# Patient Record
Sex: MALE | Race: WHITE | NOT HISPANIC OR LATINO | Employment: STUDENT | URBAN - METROPOLITAN AREA
[De-identification: names, ages, dates, MRNs, and addresses within clinical notes are randomized per-mention and may not be internally consistent; named-entity substitution may affect disease eponyms.]

---

## 2017-10-01 ENCOUNTER — HOSPITAL ENCOUNTER (EMERGENCY)
Facility: HOSPITAL | Age: 27
Discharge: HOME | End: 2017-10-02
Attending: EMERGENCY MEDICINE
Payer: MEDICAID

## 2017-10-01 DIAGNOSIS — F32.A DEPRESSION, UNSPECIFIED DEPRESSION TYPE: Primary | ICD-10-CM

## 2017-10-01 LAB
ALBUMIN SERPL BCP-MCNC: 3.7 G/DL
ALP SERPL-CCNC: 74 U/L
ALT SERPL W/O P-5'-P-CCNC: 52 U/L
AMPHET+METHAMPHET UR QL: NEGATIVE
ANION GAP SERPL CALC-SCNC: 13 MMOL/L
APAP SERPL-MCNC: <3 UG/ML
AST SERPL-CCNC: 53 U/L
BARBITURATES UR QL SCN>200 NG/ML: NEGATIVE
BASOPHILS # BLD AUTO: 0.02 K/UL
BASOPHILS NFR BLD: 0.4 %
BENZODIAZ UR QL SCN>200 NG/ML: NEGATIVE
BILIRUB SERPL-MCNC: 0.3 MG/DL
BILIRUB UR QL STRIP: NEGATIVE
BUN SERPL-MCNC: 10 MG/DL
BZE UR QL SCN: NEGATIVE
CALCIUM SERPL-MCNC: 9.3 MG/DL
CANNABINOIDS UR QL SCN: NEGATIVE
CHLORIDE SERPL-SCNC: 112 MMOL/L
CLARITY UR REFRACT.AUTO: CLEAR
CO2 SERPL-SCNC: 20 MMOL/L
COLOR UR AUTO: NORMAL
CREAT SERPL-MCNC: 0.7 MG/DL
CREAT UR-MCNC: 58 MG/DL
DIFFERENTIAL METHOD: ABNORMAL
EOSINOPHIL # BLD AUTO: 0.1 K/UL
EOSINOPHIL NFR BLD: 1.2 %
ERYTHROCYTE [DISTWIDTH] IN BLOOD BY AUTOMATED COUNT: 13.6 %
EST. GFR  (AFRICAN AMERICAN): >60 ML/MIN/1.73 M^2
EST. GFR  (NON AFRICAN AMERICAN): >60 ML/MIN/1.73 M^2
ETHANOL SERPL-MCNC: 140 MG/DL
ETHANOL SERPL-MCNC: 293 MG/DL
ETHANOL SERPL-MCNC: 68 MG/DL
GLUCOSE SERPL-MCNC: 89 MG/DL
GLUCOSE UR QL STRIP: NEGATIVE
HCT VFR BLD AUTO: 44.9 %
HGB BLD-MCNC: 15.2 G/DL
HGB UR QL STRIP: NEGATIVE
KETONES UR QL STRIP: NEGATIVE
LEUKOCYTE ESTERASE UR QL STRIP: NEGATIVE
LYMPHOCYTES # BLD AUTO: 2 K/UL
LYMPHOCYTES NFR BLD: 38.2 %
MCH RBC QN AUTO: 31.5 PG
MCHC RBC AUTO-ENTMCNC: 33.9 G/DL
MCV RBC AUTO: 93 FL
METHADONE UR QL SCN>300 NG/ML: NEGATIVE
MONOCYTES # BLD AUTO: 0.5 K/UL
MONOCYTES NFR BLD: 9.5 %
NEUTROPHILS # BLD AUTO: 2.6 K/UL
NEUTROPHILS NFR BLD: 50.3 %
NITRITE UR QL STRIP: NEGATIVE
OPIATES UR QL SCN: NEGATIVE
PCP UR QL SCN>25 NG/ML: NEGATIVE
PH UR STRIP: 5 [PH] (ref 5–8)
PLATELET # BLD AUTO: 251 K/UL
PMV BLD AUTO: 9.7 FL
POTASSIUM SERPL-SCNC: 3.9 MMOL/L
PROT SERPL-MCNC: 7.7 G/DL
PROT UR QL STRIP: NEGATIVE
RBC # BLD AUTO: 4.83 M/UL
SODIUM SERPL-SCNC: 145 MMOL/L
SP GR UR STRIP: 1.01 (ref 1–1.03)
TOXICOLOGY INFORMATION: NORMAL
TSH SERPL DL<=0.005 MIU/L-ACNC: 0.67 UIU/ML
URN SPEC COLLECT METH UR: NORMAL
UROBILINOGEN UR STRIP-ACNC: NEGATIVE EU/DL
WBC # BLD AUTO: 5.18 K/UL

## 2017-10-01 PROCEDURE — 80307 DRUG TEST PRSMV CHEM ANLYZR: CPT

## 2017-10-01 PROCEDURE — 99284 EMERGENCY DEPT VISIT MOD MDM: CPT | Mod: ,,,

## 2017-10-01 PROCEDURE — 80053 COMPREHEN METABOLIC PANEL: CPT

## 2017-10-01 PROCEDURE — 80320 DRUG SCREEN QUANTALCOHOLS: CPT | Mod: 91

## 2017-10-01 PROCEDURE — 85025 COMPLETE CBC W/AUTO DIFF WBC: CPT

## 2017-10-01 PROCEDURE — 93005 ELECTROCARDIOGRAM TRACING: CPT

## 2017-10-01 PROCEDURE — 84443 ASSAY THYROID STIM HORMONE: CPT

## 2017-10-01 PROCEDURE — 80329 ANALGESICS NON-OPIOID 1 OR 2: CPT

## 2017-10-01 PROCEDURE — 81003 URINALYSIS AUTO W/O SCOPE: CPT

## 2017-10-01 PROCEDURE — 93010 ELECTROCARDIOGRAM REPORT: CPT | Mod: ,,, | Performed by: INTERNAL MEDICINE

## 2017-10-01 PROCEDURE — 99285 EMERGENCY DEPT VISIT HI MDM: CPT | Mod: 25

## 2017-10-01 PROCEDURE — 80320 DRUG SCREEN QUANTALCOHOLS: CPT

## 2017-10-01 NOTE — ED NOTES
"Patient's mother called and given info per patient's request, and asked if the psychiatrist could speak to the patient about possible options upon D/C; patient;s mother states that he "has had a problem with alcohol for a long time and he doesn't have insurance so maybe if he would want to, he could go to [facility] [[facility] Asks if psychiatry can be made aware of request, and bring it up to the patient.  "

## 2017-10-01 NOTE — ED PROVIDER NOTES
Encounter Date: 10/1/2017       History     Chief Complaint   Patient presents with    Depression     Pt presenred to the ED via [facility]. Pt c/o depression and anxiety. Pt states he wants to blow up a [employer].      Mr. Frias is a 27 y.o. male with no relevant pmh who presents with sxs of severe depression.    He recently moved back to his parents' house from [place] to assist with his father who just passed away from pancreatic cancer.   He reports having a number of life stressors including his father's death, mother suffering from Parkinson's disease, and having to manage the family's finances.    Stressors caused recurrent insomnia that he has coped with drinking 1 bottle of red wine every night to 'fall asleep'.   He went out drinking last night with a friend and came home this morning intoxicated with alcohol at which time his mother called the police.     He denies SI/HI. He reports mentioning that he was going to blow up the [employer] to get attention from his mother about his depressed state but that he would never act on it.     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          The history is provided by the patient.     Review of patient's allergies indicates:  No Known Allergies  No past medical history on file.  Past Surgical History:   Procedure Laterality Date    WISDOM TOOTH EXTRACTION       No family history on file.  Social History   Substance Use Topics    Smoking status: Never Smoker    Smokeless tobacco: Never Used    Alcohol use Yes     Review of Systems   Constitutional: Negative for activity change, appetite change, chills, fatigue and fever.   HENT: Negative for postnasal drip and rhinorrhea.    Eyes: Negative for photophobia and visual disturbance.   Respiratory: Negative for cough, shortness of breath and wheezing.    Cardiovascular: Negative for chest pain, palpitations and leg swelling.    Gastrointestinal: Negative for diarrhea and vomiting.   Musculoskeletal: Negative for arthralgias and myalgias.   Neurological: Negative for weakness and numbness.   Psychiatric/Behavioral: Positive for agitation and behavioral problems. Negative for self-injury. The patient is nervous/anxious.        Physical Exam     Initial Vitals [10/01/17 1128]   BP Pulse Resp Temp SpO2   129/81 98 20 98.1 °F (36.7 °C) 100 %      MAP       97         Physical Exam    Nursing note and vitals reviewed.  Constitutional: He appears well-developed and well-nourished.   HENT:   Head: Normocephalic and atraumatic.   Eyes: EOM are normal. Pupils are equal, round, and reactive to light.   Neck: Normal range of motion. Neck supple.   Cardiovascular: Normal rate, regular rhythm, normal heart sounds and intact distal pulses.   Pulmonary/Chest: Breath sounds normal. No respiratory distress. He has no wheezes.   Abdominal: Soft. Bowel sounds are normal.   Neurological: He is alert and oriented to person, place, and time. He has normal strength.   Psychiatric: He has a normal mood and affect. His behavior is normal. Thought content normal.         ED Course   Procedures  Labs Reviewed   CBC W/ AUTO DIFFERENTIAL - Abnormal; Notable for the following:        Result Value    MCH 31.5 (*)     All other components within normal limits   COMPREHENSIVE METABOLIC PANEL - Abnormal; Notable for the following:     Chloride 112 (*)     CO2 20 (*)     AST 53 (*)     ALT 52 (*)     All other components within normal limits   ALCOHOL,MEDICAL (ETHANOL) - Abnormal; Notable for the following:     Alcohol, Medical, Serum 293 (*)     All other components within normal limits   ACETAMINOPHEN LEVEL - Abnormal; Notable for the following:     Acetaminophen (Tylenol), Serum <3.0 (*)     All other components within normal limits   ALCOHOL,MEDICAL (ETHANOL) - Abnormal; Notable for the following:     Alcohol, Medical, Serum 140 (*)     All other components within  normal limits   ALCOHOL,MEDICAL (ETHANOL) - Abnormal; Notable for the following:     Alcohol, Medical, Serum 68 (*)     All other components within normal limits   TSH   URINALYSIS   DRUG SCREEN PANEL, URINE EMERGENCY                   APC / Resident Notes:   1250: Pt seen and examined. Discussed with staff. PEC put in place.   1517: Pt resting comfortably. We will recheck his MENA at 7PM. When sober psych will need to evaluate him.     12:57 AM   I assumed care of this patient. ETOH drawn at 7p was 140. ETOH at 9PM was 68. Psych evaluated patient and declared patient not harm to self or others. Threats made while intoxicated and patient had no intention of following through.  Patient has no hx of violence, no objective evidence of anders/psychosis, and also denies access to any means of actually blowing a [employer] up. Patient cleared to have PEC lifted and to be discharged home. I re-evaluated patient, denies SI/HI. Discharged to home in stable condition, return to ED warnings given, follow up and patient care instructions given.                ED Course      Clinical Impression:   The encounter diagnosis was Depression, unspecified depression type.    Disposition:   Disposition: Discharged  Condition: Stable                        Lacey iSngh PA-C  10/02/17 [id number]

## 2017-10-01 NOTE — ED NOTES
"Appearance: Patient resting comfortably and in no acute distress. Patient is clean and well groomed, with clothing properly fastened. Patient appears to be intoxicated.  Cardiac: Heart sounds audible, and patient is tachycardic, but asymptomatic. Patient states "my pressure is probably high."  Neuro/LOC: Eyes open spontaneously, behavior appropriate to situation, follows commands, facial expression symmetrical, purposeful motor response noted. AAOx4; The patient is awake, alert and aware of environment. Patient is slurring a few words, and appears to be intoxicated. States that he "went out drinking to have a good time" last night and then got into a fight with his mom and that is why he is here.  Respiratory: Breath sounds audible, equal and clear bilaterally. Airway is open and patent, respirations are spontaneous, patient has a normal effort and rate, no accessory muscle use noted.  Skin: Intact, warm and dry. Color is consistent with ethnicity. Normal skin turgor and moist mucous membranes.  Gastro: Bowel sounds audible and active x4 quadrants. No tenderness and no distention are present.  Musculoskeletal: Patient moving all extremities spontaneously, no obvious swelling or deformities noted.  Peripheral vascular: Pulses +2 x4 upper/lower extremities.     -Patient identifiers verified and correct for this patient.  -Patient resting with bedrails up x2 for safety, bed locked in low position, and call bell within reach.  "

## 2017-10-01 NOTE — MEDICAL/APP STUDENT
History     Chief Complaint   Patient presents with    Depression     Pt presenred to the ED via  EMS. Pt c/o depression and anxiety. Pt states he wants to blow up a [employer].      The history is provided by the patient.       No past medical history on file.    Mr. Frias is a 27 y.o. male with no relevant pmh who presents with sxs of severe depression.    He recently moved back to his parents' house from [place] to assist with his father who just passed away from pancreatic cancer.   He reports having a number of life stressors including his father's death, mother suffering from Parkinson's disease, and having to manage the family's finances.    Stressors caused recurrent insomnia that he has coped with drinking 1 bottle of red wine every night to 'fall asleep'.   He went out drinking last night with a friend and came home this morning intoxicated with alcohol at which time his mother called the police.     He denies SI/HI. He reports mentioning that he was going to blow up the [employer] to get attention from his mother about his depressed state but that he would never act on it.     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      Past Surgical History:   Procedure Laterality Date    WISDOM TOOTH EXTRACTION         No family history on file.    Social History   Substance Use Topics    Smoking status: Never Smoker    Smokeless tobacco: Never Used    Alcohol use Yes       Review of Systems   Constitutional: Negative for activity change, appetite change and diaphoresis.   HENT: Negative for congestion, postnasal drip, rhinorrhea, sneezing and sore throat.    Eyes: Negative for photophobia, discharge and redness.   Respiratory: Negative for cough, chest tightness, shortness of breath, wheezing and stridor.    Cardiovascular: Negative for chest pain, palpitations and leg swelling.   Gastrointestinal: Negative for abdominal pain,  "constipation, diarrhea, nausea and vomiting.   Endocrine: Negative for cold intolerance and heat intolerance.   Musculoskeletal: Negative for arthralgias.   Skin: Negative for color change and rash.   Neurological: Negative for dizziness and weakness.   Hematological: Negative for adenopathy.   Psychiatric/Behavioral: Positive for agitation and dysphoric mood. Negative for behavioral problems.      Review of Systems   Constitutional: Negative for activity change, appetite change and diaphoresis.   HENT: Negative for congestion, postnasal drip, rhinorrhea, sneezing and sore throat.    Eyes: Negative for photophobia, discharge and redness.   Respiratory: Negative for cough, chest tightness, shortness of breath, wheezing and stridor.    Cardiovascular: Negative for chest pain, palpitations and leg swelling.   Gastrointestinal: Negative for abdominal pain, constipation, diarrhea, nausea and vomiting.   Musculoskeletal: Negative for arthralgias.   Skin: Negative for color change and rash.   Neurological: Negative for dizziness and weakness.   Endo/Heme/Allergies: Negative for cold intolerance, heat intolerance and adenopathy.   Psychiatric/Behavioral: Positive for agitation and dysphoric mood. Negative for behavioral problems.       Physical Exam   /81   Pulse 98   Temp 98.1 °F (36.7 °C) (Oral)   Resp 20   Ht 6' 4" (1.93 m)   Wt (!) 145.2 kg (320 lb)   SpO2 100%   BMI 38.95 kg/m²     Physical Exam    Constitutional: He appears well-developed.   Cardiovascular: Normal rate, regular rhythm, normal heart sounds and intact distal pulses. Exam reveals no friction rub.    No murmur heard.  Pulmonary/Chest: He has no wheezes. He has no rhonchi. He has no rales.   Abdominal: He exhibits no distension and no mass. There is no tenderness. There is no rebound and no guarding.   Skin: No rash noted.   Psychiatric: He is agitated. He is not aggressive and not hyperactive. Thought content is not paranoid and not " delusional. Cognition and memory are not impaired. He does not express impulsivity. He exhibits a depressed mood. He expresses no homicidal and no suicidal ideation. He expresses no suicidal plans and no homicidal plans. He exhibits normal recent memory and normal remote memory.         ED Course     Assessment:  Patient reports severe depression following father's death.    Plan:  PEC and psychiatry evaluation  Tox screen

## 2017-10-01 NOTE — ED NOTES
Pt remains in paper scrubs, resting on stretcher comfortably. No signs of distress noted. Sitter remains at bedside in direct visual contact, charting per protocol every 15 minutes. No equipment or belongings are in the patients room. Pt aware of plan of care. Will continue to monitor.       Patient tray was delivered, and patient ate 90% of tray. Trash removed from room.

## 2017-10-01 NOTE — ED NOTES
Pt remains in paper scrubs, sleeping on stretcher comfortably, easily aroused. No signs of distress noted. Sitter remains at bedside in direct visual contact, charting per protocol every 15 minutes. No equipment or belongings are in the patients room. Pt aware of plan of care. Will continue to monitor.

## 2017-10-01 NOTE — ED NOTES
Pt remains in paper scrubs, resting on stretcher comfortably. No signs of distress noted. Sitter remains at bedside in direct visual contact, charting per protocol every 15 minutes. No equipment or belongings are in the patients room. Pt aware of plan of care. Will continue to monitor.

## 2017-10-01 NOTE — ED NOTES
Patient moved to room 20. Belongings put into bag, labeled, and locked in storage room for patient belongings. Patient given paper scrubs, and changed into them. All cords, cleaning supplies, patient belongings, and unnecessary equipment removed from patient's room. Sitter is at the bedside, with no personal belongings in or near room, and will perform patient checks q15 minutes (patient mood, etc). Transfer form signed by patient. Vital signs will be completed q4h. Will continue to monitor patient.

## 2017-10-02 VITALS
DIASTOLIC BLOOD PRESSURE: 93 MMHG | OXYGEN SATURATION: 98 % | WEIGHT: 315 LBS | SYSTOLIC BLOOD PRESSURE: 147 MMHG | HEART RATE: 110 BPM | HEIGHT: 76 IN | TEMPERATURE: 99 F | BODY MASS INDEX: 38.36 KG/M2 | RESPIRATION RATE: 18 BRPM

## 2017-10-02 PROBLEM — F10.929 ALCOHOL INTOXICATION: Status: ACTIVE | Noted: 2017-10-02

## 2017-10-02 NOTE — SUBJECTIVE & OBJECTIVE
"  Psychotherapeutics     None           Review of Systems  Objective:     Vital Signs (Most Recent):  Temp: 98.6 °F (37 °C) (10/01/17 1539)  Pulse: 104 (10/01/17 1830)  Resp: 16 (10/01/17 1830)  BP: 130/87 (10/01/17 1830)  SpO2: 97 % (10/01/17 1830) Vital Signs (24h Range):  Temp:  [98.1 °F (36.7 °C)-98.7 °F (37.1 °C)] 98.6 °F (37 °C)  Pulse:  [] 104  Resp:  [16-20] 16  SpO2:  [96 %-100 %] 97 %  BP: (129-130)/(81-88) 130/87     Height: 6' 4" (193 cm)  Weight: (!) 145.2 kg (320 lb)  Body mass index is 38.95 kg/m².    No intake or output data in the 24 hours ending 10/02/17 0005    Physical Exam   Mental Status Exam:  Appearance: fair hygiene/grooming wearing scrubs in NAD  Behavior: calm, cooperative  Speech: normal rate, tone, and volume  Mood: good  Affect: full, reactive  Thought Process: linear, goal directed  Thought Perceptions: denied AVH  Thought Content: denied SI, HI; no delusions apparent  Sensorium: awake, alert  Attention/Concentration: intact to conversation  Orientation: person, place, time, and situation  Memory: intact (recent, remote)  Abstraction: intact   Insight: limited  Judgment: fair     Significant Labs:   Recent Results (from the past 48 hour(s))   Urinalysis    Collection Time: 10/01/17 12:05 PM   Result Value Ref Range    Specimen UA Urine, Clean Catch     Color, UA Straw Yellow, Straw, Barbara    Appearance, UA Clear Clear    pH, UA 5.0 5.0 - 8.0    Specific Gravity, UA 1.010 1.005 - 1.030    Protein, UA Negative Negative    Glucose, UA Negative Negative    Ketones, UA Negative Negative    Bilirubin (UA) Negative Negative    Occult Blood UA Negative Negative    Nitrite, UA Negative Negative    Urobilinogen, UA Negative <2.0 EU/dL    Leukocytes, UA Negative Negative   Drug screen panel, emergency    Collection Time: 10/01/17 12:05 PM   Result Value Ref Range    Benzodiazepines Negative     Methadone metabolites Negative     Cocaine (Metab.) Negative     Opiate Scrn, Ur Negative     " Barbiturate Screen, Ur Negative     Amphetamine Screen, Ur Negative     THC Negative     Phencyclidine Negative     Creatinine, Random Ur 58.0 23.0 - 375.0 mg/dL    Toxicology Information SEE COMMENT    CBC auto differential    Collection Time: 10/01/17 12:29 PM   Result Value Ref Range    WBC 5.18 3.90 - 12.70 K/uL    RBC 4.83 4.60 - 6.20 M/uL    Hemoglobin 15.2 14.0 - 18.0 g/dL    Hematocrit 44.9 40.0 - 54.0 %    MCV 93 82 - 98 fL    MCH 31.5 (H) 27.0 - 31.0 pg    MCHC 33.9 32.0 - 36.0 g/dL    RDW 13.6 11.5 - 14.5 %    Platelets 251 150 - 350 K/uL    MPV 9.7 9.2 - 12.9 fL    Gran # 2.6 1.8 - 7.7 K/uL    Lymph # 2.0 1.0 - 4.8 K/uL    Mono # 0.5 0.3 - 1.0 K/uL    Eos # 0.1 0.0 - 0.5 K/uL    Baso # 0.02 0.00 - 0.20 K/uL    Gran% 50.3 38.0 - 73.0 %    Lymph% 38.2 18.0 - 48.0 %    Mono% 9.5 4.0 - 15.0 %    Eosinophil% 1.2 0.0 - 8.0 %    Basophil% 0.4 0.0 - 1.9 %    Differential Method Automated    Comprehensive metabolic panel    Collection Time: 10/01/17 12:29 PM   Result Value Ref Range    Sodium 145 136 - 145 mmol/L    Potassium 3.9 3.5 - 5.1 mmol/L    Chloride 112 (H) 95 - 110 mmol/L    CO2 20 (L) 23 - 29 mmol/L    Glucose 89 70 - 110 mg/dL    BUN, Bld 10 6 - 20 mg/dL    Creatinine 0.7 0.5 - 1.4 mg/dL    Calcium 9.3 8.7 - 10.5 mg/dL    Total Protein 7.7 6.0 - 8.4 g/dL    Albumin 3.7 3.5 - 5.2 g/dL    Total Bilirubin 0.3 0.1 - 1.0 mg/dL    Alkaline Phosphatase 74 55 - 135 U/L    AST 53 (H) 10 - 40 U/L    ALT 52 (H) 10 - 44 U/L    Anion Gap 13 8 - 16 mmol/L    eGFR if African American >60.0 >60 mL/min/1.73 m^2    eGFR if non African American >60.0 >60 mL/min/1.73 m^2   TSH    Collection Time: 10/01/17 12:29 PM   Result Value Ref Range    TSH 0.670 0.400 - 4.000 uIU/mL   Ethanol    Collection Time: 10/01/17 12:29 PM   Result Value Ref Range    Alcohol, Medical, Serum 293 (H) <10 mg/dL   Acetaminophen level    Collection Time: 10/01/17 12:29 PM   Result Value Ref Range    Acetaminophen (Tylenol), Serum <3.0 (L) 10.0 -  20.0 ug/mL   Ethanol    Collection Time: 10/01/17  6:21 PM   Result Value Ref Range    Alcohol, Medical, Serum 140 (H) <10 mg/dL   Ethanol    Collection Time: 10/01/17  9:16 PM   Result Value Ref Range    Alcohol, Medical, Serum 68 (H) <10 mg/dL      No results found for: PHENYTOIN, PHENOBARB, VALPROATE, CBMZ     Significant Imaging: I have reviewed all pertinent imaging results/findings within the past 24 hours.

## 2017-10-02 NOTE — HPI
Darryn Frias is a 27yoM with no formal past psych hx who presented to the ED intoxicated after an argument with his mother. At some point in the argument, pt threatened to blow up the mother's [employer], at which time the police were called and pt was brought to [facility] ED. Psychiatry was consulted to evaluate for danger to others.    Of note: pt arrived to [facility] ED intoxicated with a BAL of 293. Pt allowed to sober up, and psychiatry interview took place well after pt's BAL <100.     On interview, pt is calm and cooperative with appropriate affect and linear thoughts. Pt states that he recently moved back in with his folks after his dad got diagnosed with pancreatic cancer. Pt moved from [place] to his parents' place so that he could help out around the house. He has a hx of drinking regularly (several drinks 4-5x/week), but states that his drinking has become more heavy since his dad's cancer diagnosis. Pt partied all night last night until 6am with a friend, then came home intoxicated and started drinking more with intent to watch the [employer] game at 8:30am. Mother did not approve of this, and he and his mom got into an argument. Pt states that he mentioned blowing up her [employer] to get a rouse out of her because she loves her [employer]ist. Pt denies ever having any real intent to physically blow up the [employer]. Pt denies any physical exchange between him and his mother. Pt denies hx of violence and/or violent crimes. Pt has no formal psych hx, no hx of SI/HI, no past psych meds or hospitalizations. Pt expresses no desire to harm anyone, made the comments when he was intoxicated. 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 Pt denies bomb-making or intent to make a bomb of any sort. Pt denies illicit drugs and/or tobacco use, alcohol is primary substance of choice. Multiple drinks daily for past 2 weeks. Pt denies that  alcohol is a problem, thinks mother overreacted about his drinking, not interested in rehab.      Collateral: Raine (mother, cell #) [phone] - straight to voicemail, multiple attempts  by at least 30min; home phone [phone] - busy signal, no answer

## 2017-10-02 NOTE — ASSESSMENT & PLAN NOTE
"- Pt made threatening statements about "blowing up [employer]" toward mother while intoxicated during an argument, has denied HI and denied desire/intent to harm anyone since arrival  - Pt has no hx of violence, no objective evidence of anders/psychosis, and also denies access to any means of actually blowing a [employer] up  - Attempted to reach mother at both cell and home phone for more information - would ideally like to get mother's side of the story prior to discharge, but unfortunately do not have any justification for continuing this PEC as there is no objective evidence of pt being imminently danger to others    - Pt in pre-contemplative stage of change regarding his alcohol use, unlikely to attend rehab at this time. Nonetheless, would provide resource sheet for alcohol abuse resources in [facility] for pt to have if he needs help attaining sobriety  - Rescind PEC and discharge to home, pt states that he will be staying with a friend tonight    "

## 2017-10-02 NOTE — CONSULTS
[facility]  Psychiatry Consult Note    Patient Name: Darryn Frias  MRN: [id number]   Code Status: No Order  Admission Date: 10/1/2017  Hospital Length of Stay: 0 days  Expected Discharge Date:   Attending Physician: Washington Hilliard MD  Primary Care Provider: Devante Jon MD    Current Legal Status: [facility]    Patient information was obtained from patient and ER records.     Subjective:     Principal Problem:<principal problem not specified>    HPI: Darryn Frias is a 27yoM with no formal past psych hx who presented to the ED intoxicated after an argument with his mother. At some point in the argument, pt threatened to blow up the mother's Christian, at which time the police were called and pt was brought to [facility]. Psychiatry was consulted to evaluate for danger to others.    Of note: pt arrived to [facility] intoxicated with a BAL of 293. Pt allowed to sober up, and psychiatry interview took place well after pt's BAL <100.     On interview, pt is calm and cooperative with appropriate affect and linear thoughts. Pt states that he recently moved back in with his folks after his dad got diagnosed with pancreatic cancer. Pt moved from [place] to his parents' place so that he could help out around the house. He has a hx of drinking regularly (several drinks 4-5x/week), but states that his drinking has become more heavy since his dad's cancer diagnosis. Pt partied all night last night until 6am with a friend, then came home intoxicated and started drinking more with intent to watch the [employer] game at 8:30am. Mother did not approve of this, and he and his mom got into an argument. Pt states that he mentioned blowing up her Christian to get a rouse out of her because she loves her Christian. Pt denies ever having any real intent to physically blow up the Christian. Pt denies any physical exchange between him and his mother. Pt denies hx of violence and/or violent crimes. Pt has no formal psych hx, no hx of  "SI/HI, no past psych meds or hospitalizations. Pt expresses no desire to harm anyone, made the comments when he was intoxicated. Pt denies having any physical way of blowing up a Jewish, says that he has some storebought fireworks but it is nothing capable of blowing up a Jewish. Pt denies bomb-making or intent to make a bomb of any sort. Pt denies illicit drugs and/or tobacco use, alcohol is primary substance of choice. Multiple drinks daily for past 2 weeks. Pt denies that alcohol is a problem, thinks mother overreacted about his drinking, not interested in rehab.      Collateral: Raine (mother, cell #) [phone] - straight to voicemail, multiple attempts  by at least 30min; home phone [phone] - busy signal, no answer    Hospital Course: No notes on file      Psychotherapeutics     None           Review of Systems  Objective:     Vital Signs (Most Recent):  Temp: 98.6 °F (37 °C) (10/01/17 1539)  Pulse: 104 (10/01/17 1830)  Resp: 16 (10/01/17 1830)  BP: 130/87 (10/01/17 1830)  SpO2: 97 % (10/01/17 1830) Vital Signs (24h Range):  Temp:  [98.1 °F (36.7 °C)-98.7 °F (37.1 °C)] 98.6 °F (37 °C)  Pulse:  [] 104  Resp:  [16-20] 16  SpO2:  [96 %-100 %] 97 %  BP: (129-130)/(81-88) 130/87     Height: 6' 4" (193 cm)  Weight: (!) 145.2 kg (320 lb)  Body mass index is 38.95 kg/m².    No intake or output data in the 24 hours ending 10/02/17 0005    Physical Exam   Mental Status Exam:  Appearance: fair hygiene/grooming wearing scrubs in NAD  Behavior: calm, cooperative  Speech: normal rate, tone, and volume  Mood: good  Affect: full, reactive  Thought Process: linear, goal directed  Thought Perceptions: denied AVH  Thought Content: denied SI, HI; no delusions apparent  Sensorium: awake, alert  Attention/Concentration: intact to conversation  Orientation: person, place, time, and situation  Memory: intact (recent, remote)  Abstraction: intact   Insight: limited  Judgment: fair     Significant Labs:   Recent " Results (from the past 48 hour(s))   Urinalysis    Collection Time: 10/01/17 12:05 PM   Result Value Ref Range    Specimen UA Urine, Clean Catch     Color, UA Straw Yellow, Straw, Barbara    Appearance, UA Clear Clear    pH, UA 5.0 5.0 - 8.0    Specific Gravity, UA 1.010 1.005 - 1.030    Protein, UA Negative Negative    Glucose, UA Negative Negative    Ketones, UA Negative Negative    Bilirubin (UA) Negative Negative    Occult Blood UA Negative Negative    Nitrite, UA Negative Negative    Urobilinogen, UA Negative <2.0 EU/dL    Leukocytes, UA Negative Negative   Drug screen panel, emergency    Collection Time: 10/01/17 12:05 PM   Result Value Ref Range    Benzodiazepines Negative     Methadone metabolites Negative     Cocaine (Metab.) Negative     Opiate Scrn, Ur Negative     Barbiturate Screen, Ur Negative     Amphetamine Screen, Ur Negative     THC Negative     Phencyclidine Negative     Creatinine, Random Ur 58.0 23.0 - 375.0 mg/dL    Toxicology Information SEE COMMENT    CBC auto differential    Collection Time: 10/01/17 12:29 PM   Result Value Ref Range    WBC 5.18 3.90 - 12.70 K/uL    RBC 4.83 4.60 - 6.20 M/uL    Hemoglobin 15.2 14.0 - 18.0 g/dL    Hematocrit 44.9 40.0 - 54.0 %    MCV 93 82 - 98 fL    MCH 31.5 (H) 27.0 - 31.0 pg    MCHC 33.9 32.0 - 36.0 g/dL    RDW 13.6 11.5 - 14.5 %    Platelets 251 150 - 350 K/uL    MPV 9.7 9.2 - 12.9 fL    Gran # 2.6 1.8 - 7.7 K/uL    Lymph # 2.0 1.0 - 4.8 K/uL    Mono # 0.5 0.3 - 1.0 K/uL    Eos # 0.1 0.0 - 0.5 K/uL    Baso # 0.02 0.00 - 0.20 K/uL    Gran% 50.3 38.0 - 73.0 %    Lymph% 38.2 18.0 - 48.0 %    Mono% 9.5 4.0 - 15.0 %    Eosinophil% 1.2 0.0 - 8.0 %    Basophil% 0.4 0.0 - 1.9 %    Differential Method Automated    Comprehensive metabolic panel    Collection Time: 10/01/17 12:29 PM   Result Value Ref Range    Sodium 145 136 - 145 mmol/L    Potassium 3.9 3.5 - 5.1 mmol/L    Chloride 112 (H) 95 - 110 mmol/L    CO2 20 (L) 23 - 29 mmol/L    Glucose 89 70 - 110 mg/dL     "BUN, Bld 10 6 - 20 mg/dL    Creatinine 0.7 0.5 - 1.4 mg/dL    Calcium 9.3 8.7 - 10.5 mg/dL    Total Protein 7.7 6.0 - 8.4 g/dL    Albumin 3.7 3.5 - 5.2 g/dL    Total Bilirubin 0.3 0.1 - 1.0 mg/dL    Alkaline Phosphatase 74 55 - 135 U/L    AST 53 (H) 10 - 40 U/L    ALT 52 (H) 10 - 44 U/L    Anion Gap 13 8 - 16 mmol/L    eGFR if African American >60.0 >60 mL/min/1.73 m^2    eGFR if non African American >60.0 >60 mL/min/1.73 m^2   TSH    Collection Time: 10/01/17 12:29 PM   Result Value Ref Range    TSH 0.670 0.400 - 4.000 uIU/mL   Ethanol    Collection Time: 10/01/17 12:29 PM   Result Value Ref Range    Alcohol, Medical, Serum 293 (H) <10 mg/dL   Acetaminophen level    Collection Time: 10/01/17 12:29 PM   Result Value Ref Range    Acetaminophen (Tylenol), Serum <3.0 (L) 10.0 - 20.0 ug/mL   Ethanol    Collection Time: 10/01/17  6:21 PM   Result Value Ref Range    Alcohol, Medical, Serum 140 (H) <10 mg/dL   Ethanol    Collection Time: 10/01/17  9:16 PM   Result Value Ref Range    Alcohol, Medical, Serum 68 (H) <10 mg/dL      No results found for: PHENYTOIN, PHENOBARB, VALPROATE, CBMZ     Significant Imaging: I have reviewed all pertinent imaging results/findings within the past 24 hours.    Assessment/Plan:     Alcohol intoxication    - Pt made threatening statements about "blowing up [employer]" toward mother while intoxicated during an argument, has denied HI and denied desire/intent to harm anyone since arrival  - Pt has no hx of violence, no objective evidence of anders/psychosis, and also denies access to any means of actually blowing a [employer] up  - Attempted to reach mother at both cell and home phone for more information - would ideally like to get mother's side of the story prior to discharge, but unfortunately do not have any justification for continuing this PEC as there is no objective evidence of pt being imminently danger to others    - Pt in pre-contemplative stage of change regarding his alcohol use, unlikely " to attend rehab at this time. Nonetheless, would provide resource sheet for alcohol abuse resources in [facility] for pt to have if he needs help attaining sobriety  - Rescind PEC and discharge to home, pt states that he will be staying with a friend tonight               Need for Continued Hospitalization:   No need for inpatient psychiatric hospitalization. Continue medical care as per the primary team.    Anticipated Disposition: Home or Self Care    Simone Su MD  [employer] Psychiatry PGY-II  10/02/2017 12:14 AM

## 2018-08-16 ENCOUNTER — HOSPITAL ENCOUNTER (EMERGENCY)
Facility: HOSPITAL | Age: 28
Discharge: HOME | End: 2018-08-17
Payer: MEDICAID

## 2018-08-16 VITALS
BODY MASS INDEX: 47.09 KG/M2 | SYSTOLIC BLOOD PRESSURE: 121 MMHG | HEIGHT: 67 IN | TEMPERATURE: 97 F | RESPIRATION RATE: 19 BRPM | DIASTOLIC BLOOD PRESSURE: 79 MMHG | OXYGEN SATURATION: 95 % | WEIGHT: 300 LBS | HEART RATE: 107 BPM

## 2018-08-16 DIAGNOSIS — F10.120: Primary | ICD-10-CM

## 2018-08-16 LAB
ALBUMIN SERPL BCP-MCNC: 4.2 G/DL
ALP SERPL-CCNC: 57 U/L
ALT SERPL W/O P-5'-P-CCNC: 86 U/L
AMPHET+METHAMPHET UR QL: NEGATIVE
ANION GAP SERPL CALC-SCNC: 20 MMOL/L
APAP SERPL-MCNC: <3 UG/ML
AST SERPL-CCNC: 111 U/L
BACTERIA #/AREA URNS AUTO: ABNORMAL /HPF
BARBITURATES UR QL SCN>200 NG/ML: NEGATIVE
BASOPHILS # BLD AUTO: 0.07 K/UL
BASOPHILS NFR BLD: 1.5 %
BENZODIAZ UR QL SCN>200 NG/ML: NORMAL
BILIRUB SERPL-MCNC: 0.7 MG/DL
BILIRUB UR QL STRIP: NEGATIVE
BUN SERPL-MCNC: 10 MG/DL
BZE UR QL SCN: NEGATIVE
CALCIUM SERPL-MCNC: 9.2 MG/DL
CANNABINOIDS UR QL SCN: NEGATIVE
CHLORIDE SERPL-SCNC: 101 MMOL/L
CLARITY UR REFRACT.AUTO: ABNORMAL
CO2 SERPL-SCNC: 20 MMOL/L
COLOR UR AUTO: YELLOW
CREAT SERPL-MCNC: 0.7 MG/DL
CREAT UR-MCNC: 147 MG/DL
DIFFERENTIAL METHOD: ABNORMAL
EOSINOPHIL # BLD AUTO: 0 K/UL
EOSINOPHIL NFR BLD: 0.2 %
ERYTHROCYTE [DISTWIDTH] IN BLOOD BY AUTOMATED COUNT: 13.8 %
EST. GFR  (AFRICAN AMERICAN): >60 ML/MIN/1.73 M^2
EST. GFR  (NON AFRICAN AMERICAN): >60 ML/MIN/1.73 M^2
ETHANOL SERPL-MCNC: 410 MG/DL
GLUCOSE SERPL-MCNC: 101 MG/DL
GLUCOSE UR QL STRIP: NEGATIVE
HCT VFR BLD AUTO: 47.8 %
HGB BLD-MCNC: 16.1 G/DL
HGB UR QL STRIP: ABNORMAL
HYALINE CASTS UR QL AUTO: 29 /LPF
IMM GRANULOCYTES # BLD AUTO: 0.02 K/UL
IMM GRANULOCYTES NFR BLD AUTO: 0.4 %
KETONES UR QL STRIP: ABNORMAL
LEUKOCYTE ESTERASE UR QL STRIP: NEGATIVE
LYMPHOCYTES # BLD AUTO: 1.9 K/UL
LYMPHOCYTES NFR BLD: 39.4 %
MCH RBC QN AUTO: 31.9 PG
MCHC RBC AUTO-ENTMCNC: 33.7 G/DL
MCV RBC AUTO: 95 FL
METHADONE UR QL SCN>300 NG/ML: NEGATIVE
MICROSCOPIC COMMENT: ABNORMAL
MONOCYTES # BLD AUTO: 0.4 K/UL
MONOCYTES NFR BLD: 9.1 %
NEUTROPHILS # BLD AUTO: 2.4 K/UL
NEUTROPHILS NFR BLD: 49.4 %
NITRITE UR QL STRIP: NEGATIVE
NRBC BLD-RTO: 0 /100 WBC
OPIATES UR QL SCN: NEGATIVE
PCP UR QL SCN>25 NG/ML: NEGATIVE
PH UR STRIP: 5 [PH] (ref 5–8)
PLATELET # BLD AUTO: 240 K/UL
PMV BLD AUTO: 9.3 FL
POTASSIUM SERPL-SCNC: 4.1 MMOL/L
PROT SERPL-MCNC: 8.3 G/DL
PROT UR QL STRIP: ABNORMAL
RBC # BLD AUTO: 5.05 M/UL
RBC #/AREA URNS AUTO: 17 /HPF (ref 0–4)
SODIUM SERPL-SCNC: 141 MMOL/L
SP GR UR STRIP: 1.02 (ref 1–1.03)
SQUAMOUS #/AREA URNS AUTO: 4 /HPF
TOXICOLOGY INFORMATION: NORMAL
TSH SERPL DL<=0.005 MIU/L-ACNC: 2.65 UIU/ML
URN SPEC COLLECT METH UR: ABNORMAL
UROBILINOGEN UR STRIP-ACNC: NEGATIVE EU/DL
WBC # BLD AUTO: 4.82 K/UL
WBC #/AREA URNS AUTO: 5 /HPF (ref 0–5)

## 2018-08-16 PROCEDURE — 85025 COMPLETE CBC W/AUTO DIFF WBC: CPT

## 2018-08-16 PROCEDURE — 80329 ANALGESICS NON-OPIOID 1 OR 2: CPT

## 2018-08-16 PROCEDURE — 84443 ASSAY THYROID STIM HORMONE: CPT

## 2018-08-16 PROCEDURE — 80307 DRUG TEST PRSMV CHEM ANLYZR: CPT

## 2018-08-16 PROCEDURE — 99283 EMERGENCY DEPT VISIT LOW MDM: CPT | Mod: ,,,

## 2018-08-16 PROCEDURE — 80320 DRUG SCREEN QUANTALCOHOLS: CPT

## 2018-08-16 PROCEDURE — 81001 URINALYSIS AUTO W/SCOPE: CPT

## 2018-08-16 PROCEDURE — 99284 EMERGENCY DEPT VISIT MOD MDM: CPT

## 2018-08-16 PROCEDURE — 80053 COMPREHEN METABOLIC PANEL: CPT

## 2018-08-17 NOTE — ED NOTES
Patient identifiers have been checked and are correct for Darryn Frias.     LOC: The patient is awake, alert with slurred speech. Patient appears to be intoxicated.   PSYCHOSOCIAL: Patient is calm and cooperative. Appears clean, well maintained, with clothing appropriate to environment. No evidence of delusions, hallucinations, or psychosis. Patient denies SI/HI.  SKIN: The skin is warm, dry, and intact. No bruising/discolorations noted.  RESPIRATORY: Airway is open and patent. Bilateral chest rise and fall. Respirations are spontaneous, even and unlabored. Normal effort and rate noted. No accessory muscle use noted.   CARDIAC: Patient has a normal HR. No peripheral edema noted. Capillary refill <3 seconds.   ABDOMEN: Soft and non tender to palpation. No distention noted. Bowel sounds present in all 4 quadrants.   URINARY: Patient reports routine urination without pain, frequency, or urgency. Voids independently. Reports urine appears dorys/yellow in color.  EXTREMITIES: No redness, heat, swelling, deformity, or pain.  PULSES: 2+ and equal in all extremities.   NEUROLOGIC: Eyes open spontaneously. Tolerating saliva secretions well. Able to follow commands, demonstrating ability to actively and appropriately communicate within context of current conversation. Symmetrical facial muscles. Moving all extremities well with no noted weakness. Adequate muscle tone present. Movement is purposeful.   MUSCULOSKELETAL: No obvious deformities noted.

## 2018-08-17 NOTE — ED PROVIDER NOTES
Encounter Date: 8/16/2018       History     Chief Complaint   Patient presents with    Alcohol Intoxication     has been drinking all day - pt's  mother hung herself  recently and pt in not coping well - pt is depressed - denies SI/HI -      Mr. Frias is a 28 year old male presenting to the ED with acute alcohol intoxication. He states that he has been depressed due to his mother committing suicide 2 weeks ago. He says he has a 4 year history of heavy drinking, more than 5 drinks a day. But since his mothers death, he says his consumption has increased to 12-15 a day. He reports difficulty sleeping for the past two weeks. He has no SI or HI. He states that he wants help for his depression and alcohol consumption. He denies use of any illicit drugs.           Review of patient's allergies indicates:  No Known Allergies  History reviewed. No pertinent past medical history.  Past Surgical History:   Procedure Laterality Date    WISDOM TOOTH EXTRACTION       History reviewed. No pertinent family history.  Social History     Tobacco Use    Smoking status: Never Smoker    Smokeless tobacco: Never Used   Substance Use Topics    Alcohol use: Yes     Comment: 12-14 vodka drinks per day    Drug use: No     Review of Systems   Constitutional: Negative for activity change and fever.   HENT: Negative for trouble swallowing.    Eyes: Negative for visual disturbance.   Respiratory: Negative for shortness of breath.    Cardiovascular: Negative for chest pain.   Gastrointestinal: Negative for abdominal distention and abdominal pain.   Genitourinary: Negative for difficulty urinating.   Musculoskeletal: Negative for gait problem and myalgias.   Neurological: Negative for light-headedness, numbness and headaches.   Psychiatric/Behavioral: Positive for confusion (Acute intoxication), dysphoric mood and sleep disturbance (difficulty sleeping for 2 weeks). Negative for self-injury and suicidal ideas.       Physical Exam      Initial Vitals   BP Pulse Resp Temp SpO2   08/16/18 1925 08/16/18 1925 08/16/18 1925 08/16/18 1930 08/16/18 1925   126/78 98 18 98.4 °F (36.9 °C) 99 %      MAP       --                Physical Exam    Constitutional: He appears well-developed and well-nourished.   HENT:   Head: Normocephalic and atraumatic.   Eyes: EOM are normal.   Neck: Normal range of motion.   Cardiovascular: Normal rate and normal heart sounds.   Pulmonary/Chest: Breath sounds normal.   Abdominal: Soft. Bowel sounds are normal.   Musculoskeletal: Normal range of motion.   Neurological: He is alert and oriented to person, place, and time.   Skin: Skin is dry.   Psychiatric:   Acute alcohol intoxication         ED Course   Procedures  Labs Reviewed   CBC W/ AUTO DIFFERENTIAL - Abnormal; Notable for the following components:       Result Value    MCH 31.9 (*)     All other components within normal limits   COMPREHENSIVE METABOLIC PANEL - Abnormal; Notable for the following components:    CO2 20 (*)      (*)     ALT 86 (*)     Anion Gap 20 (*)     All other components within normal limits   URINALYSIS, REFLEX TO URINE CULTURE - Abnormal; Notable for the following components:    Appearance, UA Hazy (*)     Protein, UA 2+ (*)     Ketones, UA 1+ (*)     Occult Blood UA 1+ (*)     All other components within normal limits    Narrative:     Preferred Collection Type->Urine, Clean Catch   ALCOHOL,MEDICAL (ETHANOL) - Abnormal; Notable for the following components:    Alcohol, Medical, Serum 410 (*)     All other components within normal limits   ACETAMINOPHEN LEVEL - Abnormal; Notable for the following components:    Acetaminophen (Tylenol), Serum <3.0 (*)     All other components within normal limits   URINALYSIS MICROSCOPIC - Abnormal; Notable for the following components:    RBC, UA 17 (*)     Bacteria, UA Moderate (*)     Hyaline Casts, UA 29 (*)     All other components within normal limits    Narrative:     Preferred Collection  Type->Urine, Clean Catch   TSH   DRUG SCREEN PANEL, URINE EMERGENCY    Narrative:     Preferred Collection Type->Urine, Clean Catch          Imaging Results    None                APC / Resident Notes:   Emergent evaluation of a 27 yo male patient presenting to the ER due to alcohol intoxication  Pt states he has been depressed since his mother  2 weeks ago. He has a history of alcohol misuse which has increased since the death of his mother. He reports no SI or HI. He states that he wants help with his depression and alcohol misuse. The patient reports no use     On exam, pt appears to be acutely intoxicated. He has slurred, but coherent speech and is able to follow commands.     I will get labs, UDS,       I discussed the care of this patient with my Supervising Physician.      Patient is hemodynamically stable, vital signs are normal.   Discharge instructions given.  Return to ED precautions discussed. Follow up as directed. Pt verbalized understanding of this plan.     Pt is stable for discharge.                    Clinical Impression:   The encounter diagnosis was Alcohol use with uncomplicated intoxication with mild use disorder.                             Misael Duff MD  Resident  [phone]

## 2018-08-17 NOTE — ED NOTES
Patient's father is here to pick him up. Patient's father verbalized that he is comfortable taking patient home and that they will follow up with a physician.

## 2018-08-17 NOTE — ED TRIAGE NOTES
"Patient presents to the ED via EMS with complaints of Depression and Alcoholism. Patient reports that he is drinking 12-15 vodka drinks per day. Patient states, "I don't know how else to cope with this." Patient reports that he lost his mother a few months ago and he has struggled with depression since that time. He was at home and his little brother called EMS due to his intoxication. Patient denies SI/HI. Patient reports that he was seen here approx. 3 months ago for same symptoms.   "

## 2019-06-22 ENCOUNTER — HOSPITAL ENCOUNTER (EMERGENCY)
Facility: HOSPITAL | Age: 29
Discharge: HOME | End: 2019-06-22
Attending: EMERGENCY MEDICINE
Payer: MEDICAID

## 2019-06-22 VITALS
OXYGEN SATURATION: 99 % | RESPIRATION RATE: 18 BRPM | HEIGHT: 74 IN | SYSTOLIC BLOOD PRESSURE: 130 MMHG | DIASTOLIC BLOOD PRESSURE: 96 MMHG | WEIGHT: 300 LBS | BODY MASS INDEX: 38.5 KG/M2 | TEMPERATURE: 99 F | HEART RATE: 90 BPM

## 2019-06-22 DIAGNOSIS — Z78.9 ALCOHOL USE: Primary | ICD-10-CM

## 2019-06-22 PROCEDURE — 99281 EMR DPT VST MAYX REQ PHY/QHP: CPT | Mod: ,,, | Performed by: EMERGENCY MEDICINE

## 2019-06-22 PROCEDURE — 99281 EMR DPT VST MAYX REQ PHY/QHP: CPT

## 2019-06-22 PROCEDURE — 99281 PR EMERGENCY DEPT VISIT,LEVEL I: ICD-10-PCS | Mod: ,,, | Performed by: EMERGENCY MEDICINE

## 2019-06-22 NOTE — ED PROVIDER NOTES
"Encounter Date: 6/22/2019    SCRIBE #1 NOTE: I, Jackelyn Diane, am scribing for, and in the presence of, Jalyn Hernandez MD. Other sections scribed: HPI, ROS.       History     Chief Complaint   Patient presents with    Psychiatric Evaluation     pt presents with ems after altercation with father - denies si/hi - reports that dad was drinlking when they got into a fight and he stated " if i killed myself you wouldn't care"  - reports mother committed suicide 1 year ago and thats what they were arguing about     Time patient was seen by the provider: 1:29 AM      The patient is a 28 y.o. male with co-morbidities including: anxiety, depression, and HTN, who presents to the ED for a psychiatric evaluation. Patient was involved in a verbal altercation with his father earlier this evening. Patient stated he would commit suicide "like his mom", and this caused his father to call the police. Patient and his father were arguing about money and the patient's condo. Patient endorses both him and his father were drinking this evening. Patient denies threatening his father or physically assaulting. Patient is prescribed Lorazepam for depression and anxiety.    The history is provided by the patient.     Review of patient's allergies indicates:  No Known Allergies  Past Medical History:   Diagnosis Date    Anxiety     Depression     Hypertension      Past Surgical History:   Procedure Laterality Date    WISDOM TOOTH EXTRACTION       History reviewed. No pertinent family history.  Social History     Tobacco Use    Smoking status: Never Smoker    Smokeless tobacco: Never Used   Substance Use Topics    Alcohol use: Yes     Comment: 2-3 drinks with vodka/ice tea day 12ozes    Drug use: No     Review of Systems   Constitutional: Negative for fever.   HENT: Negative for sore throat.    Respiratory: Negative for shortness of breath.    Cardiovascular: Negative for chest pain.   Gastrointestinal: Negative for nausea. "   Genitourinary: Negative for dysuria.   Musculoskeletal: Negative for back pain.   Skin: Negative for rash.   Neurological: Negative for weakness.   Hematological: Does not bruise/bleed easily.   Psychiatric/Behavioral:        Positive for psych evaluation. Negative for threatening.        Physical Exam     Initial Vitals [06/22/19 0101]   BP Pulse Resp Temp SpO2   (!) 130/96 90 18 98.8 °F (37.1 °C) 99 %      MAP       --         Physical Exam    Nursing note and vitals reviewed.  Constitutional: He appears well-developed and well-nourished. No distress.   HENT:   Head: Normocephalic and atraumatic.   Mouth/Throat: Oropharynx is clear and moist.   Eyes: Conjunctivae are normal.   Neck: Normal range of motion.   Cardiovascular: Normal rate, regular rhythm and normal heart sounds.   Pulmonary/Chest: Breath sounds normal. No respiratory distress.   Abdominal: Soft. He exhibits no distension. There is no tenderness.   Musculoskeletal: Normal range of motion.   Neurological: He is alert and oriented to person, place, and time.   Skin: Skin is warm and dry.         ED Course   Procedures  Labs Reviewed - No data to display       Imaging Results    None          Medical Decision Making:   History:   Old Medical Records: I decided to obtain old medical records.  Initial Assessment:   Emergent evaluation of psychiatric evaluation after argument. Patient was fighting with his father. I spoke to the patient's father, who is clearly intoxicated and doesn't know where his son is. Patient's father thought the patient was still at home. I reviewed the patient's chart. I do not feel he is a danger to himself and that the argument stemmed from alcohol use. Patient is not clinically intoxicated. Patient has clear speech and normal gait. Patient has a sober friend picking him up. I feel he is safe for discharge.             Scribe Attestation:   Scribe #1: I performed the above scribed service and the documentation accurately  describes the services I performed. I attest to the accuracy of the note.               Clinical Impression:       ICD-10-CM ICD-9-CM   1. Alcohol use Z78.9 V49.89                                Jalyn Hernandez MD  06/22/19 [id number]

## 2019-06-22 NOTE — ED TRIAGE NOTES
"Pt presents to the ED c/o of verbal altercation with his father. Pt states he was drinking with his father at their residence when a conversation presented over money. The father became irate with the pt and discussed a sensitive topic concerning the pt's  mother who committed suicide 1 year ago. The father called 911 and officers gave the pt the choice of either going to residential or going the ED to get checked out since the pt has been heavily drinking. Pt states the father stated "I wish you were out of my life just like sae, because if I killed myself you wouldn't care". Pt denies SI/HI, drug use, physical altercation with father. States he's on ativan for anxiety but does not know the correct dose. Pt calm and cooperative. Pt does states that he needs to leave due to having to be at a meeting at 3am in the morning in [place], [place]. Pt is AAOx4. Name and  checked and verified.  "

## 2019-07-19 ENCOUNTER — HOSPITAL ENCOUNTER (EMERGENCY)
Facility: HOSPITAL | Age: 29
Discharge: HOME | End: 2019-07-19
Attending: EMERGENCY MEDICINE
Payer: MEDICAID

## 2019-07-19 VITALS
WEIGHT: 300 LBS | HEART RATE: 81 BPM | HEIGHT: 74 IN | SYSTOLIC BLOOD PRESSURE: 131 MMHG | TEMPERATURE: 98 F | OXYGEN SATURATION: 98 % | DIASTOLIC BLOOD PRESSURE: 69 MMHG | RESPIRATION RATE: 18 BRPM | BODY MASS INDEX: 38.5 KG/M2

## 2019-07-19 DIAGNOSIS — R11.10 VOMITING, INTRACTABILITY OF VOMITING NOT SPECIFIED, PRESENCE OF NAUSEA NOT SPECIFIED, UNSPECIFIED VOMITING TYPE: Primary | ICD-10-CM

## 2019-07-19 LAB
ALBUMIN SERPL BCP-MCNC: 4.1 G/DL (ref 3.5–5.2)
ALP SERPL-CCNC: 43 U/L (ref 55–135)
ALT SERPL W/O P-5'-P-CCNC: 83 U/L (ref 10–44)
ANION GAP SERPL CALC-SCNC: 18 MMOL/L (ref 8–16)
AST SERPL-CCNC: 78 U/L (ref 10–40)
BACTERIA #/AREA URNS AUTO: ABNORMAL /HPF
BASOPHILS # BLD AUTO: 0.05 K/UL (ref 0–0.2)
BASOPHILS NFR BLD: 0.6 % (ref 0–1.9)
BILIRUB SERPL-MCNC: 2.3 MG/DL (ref 0.1–1)
BILIRUB UR QL STRIP: NEGATIVE
BUN SERPL-MCNC: 22 MG/DL (ref 6–20)
CALCIUM SERPL-MCNC: 10.8 MG/DL (ref 8.7–10.5)
CHLORIDE SERPL-SCNC: 90 MMOL/L (ref 95–110)
CK SERPL-CCNC: 275 U/L (ref 20–200)
CLARITY UR REFRACT.AUTO: ABNORMAL
CO2 SERPL-SCNC: 26 MMOL/L (ref 23–29)
COLOR UR AUTO: YELLOW
CREAT SERPL-MCNC: 0.9 MG/DL (ref 0.5–1.4)
DIFFERENTIAL METHOD: ABNORMAL
EOSINOPHIL # BLD AUTO: 0 K/UL (ref 0–0.5)
EOSINOPHIL NFR BLD: 0.5 % (ref 0–8)
ERYTHROCYTE [DISTWIDTH] IN BLOOD BY AUTOMATED COUNT: 13.4 % (ref 11.5–14.5)
EST. GFR  (AFRICAN AMERICAN): >60 ML/MIN/1.73 M^2
EST. GFR  (NON AFRICAN AMERICAN): >60 ML/MIN/1.73 M^2
GLUCOSE SERPL-MCNC: 118 MG/DL (ref 70–110)
GLUCOSE UR QL STRIP: NEGATIVE
HCT VFR BLD AUTO: 45.8 % (ref 40–54)
HGB BLD-MCNC: 16.2 G/DL (ref 14–18)
HGB UR QL STRIP: NEGATIVE
HYALINE CASTS UR QL AUTO: 4 /LPF
IMM GRANULOCYTES # BLD AUTO: 0.03 K/UL (ref 0–0.04)
IMM GRANULOCYTES NFR BLD AUTO: 0.3 % (ref 0–0.5)
KETONES UR QL STRIP: NEGATIVE
LEUKOCYTE ESTERASE UR QL STRIP: ABNORMAL
LIPASE SERPL-CCNC: 12 U/L (ref 4–60)
LYMPHOCYTES # BLD AUTO: 1.1 K/UL (ref 1–4.8)
LYMPHOCYTES NFR BLD: 12.9 % (ref 18–48)
MCH RBC QN AUTO: 34 PG (ref 27–31)
MCHC RBC AUTO-ENTMCNC: 35.4 G/DL (ref 32–36)
MCV RBC AUTO: 96 FL (ref 82–98)
MICROSCOPIC COMMENT: ABNORMAL
MONOCYTES # BLD AUTO: 0.9 K/UL (ref 0.3–1)
MONOCYTES NFR BLD: 9.9 % (ref 4–15)
NEUTROPHILS # BLD AUTO: 6.5 K/UL (ref 1.8–7.7)
NEUTROPHILS NFR BLD: 75.8 % (ref 38–73)
NITRITE UR QL STRIP: NEGATIVE
NON-SQ EPI CELLS #/AREA URNS AUTO: 1 /HPF
NRBC BLD-RTO: 0 /100 WBC
PH UR STRIP: 5 [PH] (ref 5–8)
PLATELET # BLD AUTO: 259 K/UL (ref 150–350)
PMV BLD AUTO: 10.5 FL (ref 9.2–12.9)
POTASSIUM SERPL-SCNC: 3.5 MMOL/L (ref 3.5–5.1)
PROT SERPL-MCNC: 7.7 G/DL (ref 6–8.4)
PROT UR QL STRIP: NEGATIVE
RBC # BLD AUTO: 4.76 M/UL (ref 4.6–6.2)
RBC #/AREA URNS AUTO: 2 /HPF (ref 0–4)
SODIUM SERPL-SCNC: 134 MMOL/L (ref 136–145)
SP GR UR STRIP: 1.01 (ref 1–1.03)
SQUAMOUS #/AREA URNS AUTO: 3 /HPF
URN SPEC COLLECT METH UR: ABNORMAL
WBC # BLD AUTO: 8.6 K/UL (ref 3.9–12.7)
WBC #/AREA URNS AUTO: 23 /HPF (ref 0–5)
WBC CLUMPS UR QL AUTO: ABNORMAL

## 2019-07-19 PROCEDURE — 87086 URINE CULTURE/COLONY COUNT: CPT

## 2019-07-19 PROCEDURE — 81001 URINALYSIS AUTO W/SCOPE: CPT

## 2019-07-19 PROCEDURE — 25000003 PHARM REV CODE 250: Performed by: STUDENT IN AN ORGANIZED HEALTH CARE EDUCATION/TRAINING PROGRAM

## 2019-07-19 PROCEDURE — 25000003 PHARM REV CODE 250: Performed by: EMERGENCY MEDICINE

## 2019-07-19 PROCEDURE — 99284 EMERGENCY DEPT VISIT MOD MDM: CPT | Mod: 25

## 2019-07-19 PROCEDURE — 85025 COMPLETE CBC W/AUTO DIFF WBC: CPT

## 2019-07-19 PROCEDURE — 99284 PR EMERGENCY DEPT VISIT,LEVEL IV: ICD-10-PCS | Mod: ,,, | Performed by: EMERGENCY MEDICINE

## 2019-07-19 PROCEDURE — 96361 HYDRATE IV INFUSION ADD-ON: CPT

## 2019-07-19 PROCEDURE — 80053 COMPREHEN METABOLIC PANEL: CPT

## 2019-07-19 PROCEDURE — 82550 ASSAY OF CK (CPK): CPT

## 2019-07-19 PROCEDURE — 63600175 PHARM REV CODE 636 W HCPCS: Performed by: STUDENT IN AN ORGANIZED HEALTH CARE EDUCATION/TRAINING PROGRAM

## 2019-07-19 PROCEDURE — 83690 ASSAY OF LIPASE: CPT

## 2019-07-19 PROCEDURE — 96374 THER/PROPH/DIAG INJ IV PUSH: CPT

## 2019-07-19 PROCEDURE — 99284 EMERGENCY DEPT VISIT MOD MDM: CPT | Mod: ,,, | Performed by: EMERGENCY MEDICINE

## 2019-07-19 RX ORDER — ONDANSETRON 2 MG/ML
4 INJECTION INTRAMUSCULAR; INTRAVENOUS
Status: COMPLETED | OUTPATIENT
Start: 2019-07-19 | End: 2019-07-19

## 2019-07-19 RX ORDER — FAMOTIDINE 10 MG/ML
20 INJECTION INTRAVENOUS
Status: DISCONTINUED | OUTPATIENT
Start: 2019-07-19 | End: 2019-07-19

## 2019-07-19 RX ORDER — SODIUM CHLORIDE 9 MG/ML
1000 INJECTION, SOLUTION INTRAVENOUS
Status: DISCONTINUED | OUTPATIENT
Start: 2019-07-19 | End: 2019-07-19

## 2019-07-19 RX ADMIN — ALUMINUM HYDROXIDE, MAGNESIUM HYDROXIDE, AND SIMETHICONE 50 ML: 200; 200; 20 SUSPENSION ORAL at 04:07

## 2019-07-19 RX ADMIN — SODIUM CHLORIDE 1000 ML: 0.9 INJECTION, SOLUTION INTRAVENOUS at 04:07

## 2019-07-19 RX ADMIN — ONDANSETRON 4 MG: 2 INJECTION INTRAMUSCULAR; INTRAVENOUS at 04:07

## 2019-07-19 RX ADMIN — SODIUM CHLORIDE 1000 ML: 0.9 INJECTION, SOLUTION INTRAVENOUS at 06:07

## 2019-07-19 NOTE — ED PROVIDER NOTES
Encounter Date: 7/19/2019       History     Chief Complaint   Patient presents with    Nausea     Patient reports nausea and vomiting for the past 24 hours.     Emesis     HPI   29 y/o M with a PMHx of anxiety and ETOH use presents to the ED with C.C of vomiting and generalized pain cramping. Pt states that his father had to be hospitalized on Monday and Tuesday night he went out and got intoxicated. 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 Pt states that Thursday he started vomiting and has not been able to tolerate fluids. He is complaining of abd cramping likely 2/2 vomiting. He states that his last drink was 3 days ago and that he could have gone through withdrawals in the past. He dines any diarrhea, CP or SOB.  He denies any SI or HI at this time  Review of patient's allergies indicates:  No Known Allergies  Past Medical History:   Diagnosis Date    Anxiety     Depression     Hypertension      Past Surgical History:   Procedure Laterality Date    WISDOM TOOTH EXTRACTION       History reviewed. No pertinent family history.  Social History     Tobacco Use    Smoking status: Never Smoker    Smokeless tobacco: Never Used   Substance Use Topics    Alcohol use: Yes     Comment: 2-3 drinks with vodka/ice tea day 12ozes    Drug use: No     Review of Systems   Constitutional: Negative for fever.   HENT: Negative for sore throat.    Respiratory: Negative for cough and shortness of breath.    Cardiovascular: Negative for chest pain.   Gastrointestinal: Positive for vomiting. Negative for abdominal distention, abdominal pain and nausea.   Genitourinary: Negative for dysuria, flank pain and urgency.   Musculoskeletal: Negative for back pain and neck pain.   Skin: Negative for rash.   Neurological: Negative for seizures, weakness, numbness and headaches.   Hematological: Does not bruise/bleed easily.       Physical Exam     Initial Vitals [07/19/19 0303]   BP Pulse Resp Temp SpO2    128/70 90 18 98.6 °F (37 °C) 97 %      MAP       --         Physical Exam    Nursing note and vitals reviewed.  Constitutional: He appears well-developed and well-nourished.   HENT:   Head: Normocephalic and atraumatic.   Mouth/Throat: Oropharynx is clear and moist. No oropharyngeal exudate.   Eyes: EOM are normal. Pupils are equal, round, and reactive to light. Right eye exhibits no discharge. Left eye exhibits no discharge.   Neck: Normal range of motion. No thyromegaly present. No tracheal deviation present. No JVD present.   Cardiovascular: Normal rate, regular rhythm, normal heart sounds and intact distal pulses.   No murmur heard.  Pulmonary/Chest: Breath sounds normal. No respiratory distress. He has no wheezes. He has no rhonchi. He has no rales.   Abdominal: Soft. Bowel sounds are normal. He exhibits no distension. There is no tenderness. There is no rebound.   Musculoskeletal: Normal range of motion. He exhibits tenderness (epigastric ). He exhibits no edema.   Neurological: He is alert and oriented to person, place, and time. He has normal strength. No sensory deficit. GCS score is 15. GCS eye subscore is 4. GCS verbal subscore is 5. GCS motor subscore is 6.   Skin: Skin is warm and dry.         ED Course   Procedures  Labs Reviewed   CBC W/ AUTO DIFFERENTIAL - Abnormal; Notable for the following components:       Result Value    Mean Corpuscular Hemoglobin 34.0 (*)     Gran% 75.8 (*)     Lymph% 12.9 (*)     All other components within normal limits   COMPREHENSIVE METABOLIC PANEL - Abnormal; Notable for the following components:    Sodium 134 (*)     Chloride 90 (*)     Glucose 118 (*)     BUN, Bld 22 (*)     Calcium 10.8 (*)     Total Bilirubin 2.3 (*)     Alkaline Phosphatase 43 (*)     AST 78 (*)     ALT 83 (*)     Anion Gap 18 (*)     All other components within normal limits   URINALYSIS, REFLEX TO URINE CULTURE - Abnormal; Notable for the following components:    Appearance, UA Hazy (*)      "Leukocytes, UA 1+ (*)     All other components within normal limits    Narrative:     Preferred Collection Type->Urine, Clean Catch   CK - Abnormal; Notable for the following components:     (*)     All other components within normal limits   URINALYSIS MICROSCOPIC - Abnormal; Notable for the following components:    WBC, UA 23 (*)     Non-Squam Epith 1 (*)     Hyaline Casts, UA 4 (*)     All other components within normal limits    Narrative:     Preferred Collection Type->Urine, Clean Catch   CULTURE, URINE   LIPASE          Imaging Results    None          Medical Decision Making:   History:   I obtained history from: someone other than patient.  Triage vitals: Temp 98.6, P 90, RR 18, /70, SpO2 97%.   Physical exam findings: Abd is soft with mild epigastric tenderness. CV and pulmonary exam is normal.    DDx: dehydration, rhabdo, electrolyte abnormalities, gastritis. Alcohol withdrawals was also considered however Pt is not tachycardic, HTN or tremulous.     Workup: CBC, CMP, UA, CPK, Lipase Tx: Gi cocktail, IVFs and zofran.     Tj Sharma MD PGY2  "7/19/2019" 3:58AM     Update:   CBC shows no acute abnormalities. CMP shows and decreased Na, Cl, and an anion gap of 18. This is likely 2/2 alcoholic ketoacidosis as the Pt has a normal bicarb and a history of ETOH use with decreased PO intake. The Pt is tolerating PO now and I orders a 2nd liter of IVF so I anticipate this to down trend.   AST and ALT are mildly elevated so I instructed the Pt to follow up with PCP for monitoring. Pt UA has 23 WBCs however is he is nitrite negative and he is denying nay urinary complaints at this time so I do not believe treating his asymptomatic pyuria is indicated at this time. Discharge and return precautions were discussed with Pt and he verbalized his understanding. Pt is stable for discharge at this time.    Tj Sharma MD PGY2  "7/19/2019" 6:55AM                    Attending Attestation:   Physician " Attestation Statement for Resident:  As the supervising MD   Physician Attestation Statement: I have personally seen and examined this patient.   I agree with the above history. -: 29 yo m, h/o intermittent heavy etoh use, here with n/v and feeling dehydrated to x 1 day.  Labs show small AG, mild elevation in LFTs.  Suspect mild AKA and dehydration.  No abd pain on my assessment and pt reported that n/v had resolved.  Recommend close f/u PCP for LFTs abnormalities - discussed this with pt   As the supervising MD I agree with the above PE.    As the supervising MD I agree with the above treatment, course, plan, and disposition.  I have reviewed and agree with the residents interpretation of the following: lab data.                       Clinical Impression:       ICD-10-CM ICD-9-CM   1. Vomiting, intractability of vomiting not specified, presence of nausea not specified, unspecified vomiting type R11.10 787.03                                Tj Sharma MD  Resident  07/19/19 0655

## 2019-07-19 NOTE — ED TRIAGE NOTES
Darryn Frias, a 28 y.o. male presents to the ED w/ complaint of N/V for the past 24 H. Pt states he is unable to hold anything down. Pt received 4mg of zofran via EMS. Pt states he worked outside in the 90 degree heat for 30+ hours. Pt states this happened previously and it was heat exhaustion.     Triage note:  Chief Complaint   Patient presents with    Nausea     Patient reports nausea and vomiting for the past 24 hours.     Emesis     Review of patient's allergies indicates:  No Known Allergies  Past Medical History:   Diagnosis Date    Anxiety     Depression     Hypertension          Adult Physical Assessment  LOC: Darryn Frias, 28 y.o. male verified via two identifiers.  The patient is awake, alert, oriented and speaking appropriately at this time.  APPEARANCE: Patient resting comfortably and appears to be in no acute distress at this time. Patient is clean and well groomed, patient's clothing is properly fastened.  SKIN:The skin is warm and dry, color consistent with ethnicity, patient has normal skin turgor and moist mucus membranes, skin intact, no breakdown or brusing noted.  MUSCULOSKELETAL: Patient moving all extremities well, no obvious swelling or deformities noted.  RESPIRATORY: Airway is open and patent, respirations are spontaneous, patient has a normal effort and rate, no accessory muscle use noted.  CARDIAC: No c/o CP  ABDOMEN: Soft and non tender to palpation, no abdominal distention noted. Bowel sounds present in all four quadrants. N/V for the past 24 h. Unable to tolerate solids or liquids. Abdominal discomfort from vomiting and not eating.   NEUROLOGIC: Eyes open spontaneously, behavior appropriate to situation, follows commands.

## 2019-07-19 NOTE — DISCHARGE INSTRUCTIONS
Please return to the ED if you are experience SOB, fever, inability to tolerate PO, abd pain, or anything else that is concerning to you.

## 2019-07-20 ENCOUNTER — TELEPHONE (OUTPATIENT)
Dept: PRIMARY CARE CLINIC | Facility: CLINIC | Age: 29
End: 2019-07-20

## 2019-07-20 LAB — BACTERIA UR CULT: NO GROWTH

## 2019-08-20 ENCOUNTER — HOSPITAL ENCOUNTER (INPATIENT)
Facility: HOSPITAL | Age: 29
LOS: 4 days | Discharge: HOME | DRG: 897 | End: 2019-08-24
Attending: EMERGENCY MEDICINE | Admitting: EMERGENCY MEDICINE
Payer: MEDICAID

## 2019-08-20 DIAGNOSIS — F41.9 ANXIETY AND DEPRESSION: ICD-10-CM

## 2019-08-20 DIAGNOSIS — F32.A ANXIETY AND DEPRESSION: ICD-10-CM

## 2019-08-20 DIAGNOSIS — F10.939 ALCOHOL WITHDRAWAL: ICD-10-CM

## 2019-08-20 DIAGNOSIS — K70.10 ALCOHOLIC HEPATITIS WITHOUT ASCITES: Primary | ICD-10-CM

## 2019-08-20 DIAGNOSIS — R07.9 CHEST PAIN: ICD-10-CM

## 2019-08-20 LAB
ALBUMIN SERPL BCP-MCNC: 4.1 G/DL (ref 3.5–5.2)
ALP SERPL-CCNC: 46 U/L (ref 55–135)
ALT SERPL W/O P-5'-P-CCNC: 75 U/L (ref 10–44)
ANION GAP SERPL CALC-SCNC: 19 MMOL/L (ref 8–16)
AST SERPL-CCNC: 156 U/L (ref 10–40)
BASOPHILS # BLD AUTO: 0.03 K/UL (ref 0–0.2)
BASOPHILS NFR BLD: 0.8 % (ref 0–1.9)
BILIRUB SERPL-MCNC: 1.1 MG/DL (ref 0.1–1)
BUN SERPL-MCNC: 9 MG/DL (ref 6–20)
CALCIUM SERPL-MCNC: 9.4 MG/DL (ref 8.7–10.5)
CHLORIDE SERPL-SCNC: 101 MMOL/L (ref 95–110)
CO2 SERPL-SCNC: 21 MMOL/L (ref 23–29)
CREAT SERPL-MCNC: 0.7 MG/DL (ref 0.5–1.4)
DIFFERENTIAL METHOD: ABNORMAL
EOSINOPHIL # BLD AUTO: 0 K/UL (ref 0–0.5)
EOSINOPHIL NFR BLD: 0 % (ref 0–8)
ERYTHROCYTE [DISTWIDTH] IN BLOOD BY AUTOMATED COUNT: 13.1 % (ref 11.5–14.5)
EST. GFR  (AFRICAN AMERICAN): >60 ML/MIN/1.73 M^2
EST. GFR  (NON AFRICAN AMERICAN): >60 ML/MIN/1.73 M^2
GLUCOSE SERPL-MCNC: 114 MG/DL (ref 70–110)
HCT VFR BLD AUTO: 42.8 % (ref 40–54)
HGB BLD-MCNC: 14.6 G/DL (ref 14–18)
IMM GRANULOCYTES # BLD AUTO: 0.02 K/UL (ref 0–0.04)
IMM GRANULOCYTES NFR BLD AUTO: 0.6 % (ref 0–0.5)
INR PPP: 1 (ref 0.8–1.2)
LIPASE SERPL-CCNC: 30 U/L (ref 4–60)
LYMPHOCYTES # BLD AUTO: 0.8 K/UL (ref 1–4.8)
LYMPHOCYTES NFR BLD: 22.8 % (ref 18–48)
MAGNESIUM SERPL-MCNC: 1.3 MG/DL (ref 1.6–2.6)
MCH RBC QN AUTO: 33 PG (ref 27–31)
MCHC RBC AUTO-ENTMCNC: 34.1 G/DL (ref 32–36)
MCV RBC AUTO: 97 FL (ref 82–98)
MONOCYTES # BLD AUTO: 0.4 K/UL (ref 0.3–1)
MONOCYTES NFR BLD: 12.3 % (ref 4–15)
NEUTROPHILS # BLD AUTO: 2.3 K/UL (ref 1.8–7.7)
NEUTROPHILS NFR BLD: 63.5 % (ref 38–73)
NRBC BLD-RTO: 0 /100 WBC
PHOSPHATE SERPL-MCNC: 3.2 MG/DL (ref 2.7–4.5)
PLATELET # BLD AUTO: 95 K/UL (ref 150–350)
PMV BLD AUTO: 10.6 FL (ref 9.2–12.9)
POCT GLUCOSE: 127 MG/DL (ref 70–110)
POTASSIUM SERPL-SCNC: 3.5 MMOL/L (ref 3.5–5.1)
PROT SERPL-MCNC: 7.5 G/DL (ref 6–8.4)
PROTHROMBIN TIME: 10.4 SEC (ref 9–12.5)
RBC # BLD AUTO: 4.43 M/UL (ref 4.6–6.2)
SODIUM SERPL-SCNC: 141 MMOL/L (ref 136–145)
TROPONIN I SERPL DL<=0.01 NG/ML-MCNC: 0.02 NG/ML (ref 0–0.03)
WBC # BLD AUTO: 3.59 K/UL (ref 3.9–12.7)

## 2019-08-20 PROCEDURE — 36415 COLL VENOUS BLD VENIPUNCTURE: CPT

## 2019-08-20 PROCEDURE — 84100 ASSAY OF PHOSPHORUS: CPT

## 2019-08-20 PROCEDURE — 11000001 HC ACUTE MED/SURG PRIVATE ROOM

## 2019-08-20 PROCEDURE — 85610 PROTHROMBIN TIME: CPT

## 2019-08-20 PROCEDURE — 99291 CRITICAL CARE FIRST HOUR: CPT | Mod: ,,, | Performed by: EMERGENCY MEDICINE

## 2019-08-20 PROCEDURE — 99285 EMERGENCY DEPT VISIT HI MDM: CPT | Mod: 25

## 2019-08-20 PROCEDURE — 99223 PR INITIAL HOSPITAL CARE,LEVL III: ICD-10-PCS | Mod: ,,, | Performed by: HOSPITALIST

## 2019-08-20 PROCEDURE — 84484 ASSAY OF TROPONIN QUANT: CPT

## 2019-08-20 PROCEDURE — 96375 TX/PRO/DX INJ NEW DRUG ADDON: CPT | Mod: 59

## 2019-08-20 PROCEDURE — 85025 COMPLETE CBC W/AUTO DIFF WBC: CPT

## 2019-08-20 PROCEDURE — 63600175 PHARM REV CODE 636 W HCPCS: Performed by: HOSPITALIST

## 2019-08-20 PROCEDURE — 96374 THER/PROPH/DIAG INJ IV PUSH: CPT

## 2019-08-20 PROCEDURE — 80053 COMPREHEN METABOLIC PANEL: CPT

## 2019-08-20 PROCEDURE — 25000003 PHARM REV CODE 250: Performed by: HOSPITALIST

## 2019-08-20 PROCEDURE — 83690 ASSAY OF LIPASE: CPT

## 2019-08-20 PROCEDURE — 63600175 PHARM REV CODE 636 W HCPCS: Performed by: EMERGENCY MEDICINE

## 2019-08-20 PROCEDURE — 83735 ASSAY OF MAGNESIUM: CPT

## 2019-08-20 PROCEDURE — 99291 PR CRITICAL CARE, E/M 30-74 MINUTES: ICD-10-PCS | Mod: ,,, | Performed by: EMERGENCY MEDICINE

## 2019-08-20 PROCEDURE — 96361 HYDRATE IV INFUSION ADD-ON: CPT

## 2019-08-20 PROCEDURE — 99223 1ST HOSP IP/OBS HIGH 75: CPT | Mod: ,,, | Performed by: HOSPITALIST

## 2019-08-20 RX ORDER — DIAZEPAM 5 MG/1
5 TABLET ORAL EVERY 8 HOURS
Status: DISCONTINUED | OUTPATIENT
Start: 2019-08-22 | End: 2019-08-20

## 2019-08-20 RX ORDER — GLUCAGON 1 MG
1 KIT INJECTION
Status: DISCONTINUED | OUTPATIENT
Start: 2019-08-20 | End: 2019-08-24 | Stop reason: HOSPADM

## 2019-08-20 RX ORDER — THIAMINE HCL 100 MG
100 TABLET ORAL DAILY
Status: DISCONTINUED | OUTPATIENT
Start: 2019-08-20 | End: 2019-08-24 | Stop reason: HOSPADM

## 2019-08-20 RX ORDER — LORAZEPAM 0.5 MG/1
2 TABLET ORAL EVERY 4 HOURS PRN
Status: DISCONTINUED | OUTPATIENT
Start: 2019-08-20 | End: 2019-08-24 | Stop reason: HOSPADM

## 2019-08-20 RX ORDER — SERTRALINE HYDROCHLORIDE 25 MG/1
25 TABLET, FILM COATED ORAL DAILY
Status: DISCONTINUED | OUTPATIENT
Start: 2019-08-20 | End: 2019-08-24 | Stop reason: HOSPADM

## 2019-08-20 RX ORDER — MORPHINE SULFATE 4 MG/ML
4 INJECTION, SOLUTION INTRAMUSCULAR; INTRAVENOUS
Status: COMPLETED | OUTPATIENT
Start: 2019-08-20 | End: 2019-08-20

## 2019-08-20 RX ORDER — SODIUM CHLORIDE 0.9 % (FLUSH) 0.9 %
10 SYRINGE (ML) INJECTION
Status: DISCONTINUED | OUTPATIENT
Start: 2019-08-20 | End: 2019-08-24 | Stop reason: HOSPADM

## 2019-08-20 RX ORDER — IPRATROPIUM BROMIDE AND ALBUTEROL SULFATE 2.5; .5 MG/3ML; MG/3ML
3 SOLUTION RESPIRATORY (INHALATION) EVERY 4 HOURS PRN
Status: DISCONTINUED | OUTPATIENT
Start: 2019-08-20 | End: 2019-08-24 | Stop reason: HOSPADM

## 2019-08-20 RX ORDER — ONDANSETRON 2 MG/ML
4 INJECTION INTRAMUSCULAR; INTRAVENOUS EVERY 8 HOURS PRN
Status: DISCONTINUED | OUTPATIENT
Start: 2019-08-20 | End: 2019-08-20

## 2019-08-20 RX ORDER — DIAZEPAM 10 MG/2ML
10 INJECTION INTRAMUSCULAR
Status: COMPLETED | OUTPATIENT
Start: 2019-08-20 | End: 2019-08-20

## 2019-08-20 RX ORDER — DIAZEPAM 5 MG/1
5 TABLET ORAL DAILY
Status: DISCONTINUED | OUTPATIENT
Start: 2019-08-24 | End: 2019-08-20

## 2019-08-20 RX ORDER — DIAZEPAM 5 MG/1
5 TABLET ORAL EVERY 6 HOURS
Status: DISCONTINUED | OUTPATIENT
Start: 2019-08-21 | End: 2019-08-20

## 2019-08-20 RX ORDER — FOLIC ACID 1 MG/1
1 TABLET ORAL DAILY
Status: DISCONTINUED | OUTPATIENT
Start: 2019-08-20 | End: 2019-08-24 | Stop reason: HOSPADM

## 2019-08-20 RX ORDER — DIAZEPAM 5 MG/1
5 TABLET ORAL EVERY 12 HOURS
Status: DISCONTINUED | OUTPATIENT
Start: 2019-08-23 | End: 2019-08-20

## 2019-08-20 RX ORDER — BISACODYL 10 MG
10 SUPPOSITORY, RECTAL RECTAL DAILY PRN
Status: DISCONTINUED | OUTPATIENT
Start: 2019-08-20 | End: 2019-08-24 | Stop reason: HOSPADM

## 2019-08-20 RX ORDER — ONDANSETRON 2 MG/ML
4 INJECTION INTRAMUSCULAR; INTRAVENOUS EVERY 6 HOURS PRN
Status: DISCONTINUED | OUTPATIENT
Start: 2019-08-20 | End: 2019-08-24 | Stop reason: HOSPADM

## 2019-08-20 RX ORDER — IBUPROFEN 200 MG
16 TABLET ORAL
Status: DISCONTINUED | OUTPATIENT
Start: 2019-08-20 | End: 2019-08-24 | Stop reason: HOSPADM

## 2019-08-20 RX ORDER — IBUPROFEN 200 MG
24 TABLET ORAL
Status: DISCONTINUED | OUTPATIENT
Start: 2019-08-20 | End: 2019-08-24 | Stop reason: HOSPADM

## 2019-08-20 RX ORDER — SODIUM CHLORIDE 0.9 % (FLUSH) 0.9 %
10 SYRINGE (ML) INJECTION
Status: DISCONTINUED | OUTPATIENT
Start: 2019-08-20 | End: 2019-08-20

## 2019-08-20 RX ORDER — DIAZEPAM 10 MG/2ML
10 INJECTION INTRAMUSCULAR EVERY 8 HOURS
Status: DISCONTINUED | OUTPATIENT
Start: 2019-08-20 | End: 2019-08-21

## 2019-08-20 RX ORDER — ACETAMINOPHEN 325 MG/1
650 TABLET ORAL EVERY 4 HOURS PRN
Status: DISCONTINUED | OUTPATIENT
Start: 2019-08-20 | End: 2019-08-24 | Stop reason: HOSPADM

## 2019-08-20 RX ORDER — AMOXICILLIN 250 MG
1 CAPSULE ORAL 2 TIMES DAILY
Status: DISCONTINUED | OUTPATIENT
Start: 2019-08-20 | End: 2019-08-24 | Stop reason: HOSPADM

## 2019-08-20 RX ORDER — DIAZEPAM 5 MG/ML
10 INJECTION, SOLUTION INTRAMUSCULAR; INTRAVENOUS ONCE
Status: COMPLETED | OUTPATIENT
Start: 2019-08-20 | End: 2019-08-20

## 2019-08-20 RX ORDER — SODIUM CHLORIDE 9 MG/ML
INJECTION, SOLUTION INTRAVENOUS CONTINUOUS
Status: DISCONTINUED | OUTPATIENT
Start: 2019-08-20 | End: 2019-08-20

## 2019-08-20 RX ORDER — HYDRALAZINE HYDROCHLORIDE 25 MG/1
25 TABLET, FILM COATED ORAL EVERY 8 HOURS PRN
Status: DISCONTINUED | OUTPATIENT
Start: 2019-08-20 | End: 2019-08-24 | Stop reason: HOSPADM

## 2019-08-20 RX ORDER — POTASSIUM CHLORIDE 20 MEQ/1
40 TABLET, EXTENDED RELEASE ORAL ONCE
Status: COMPLETED | OUTPATIENT
Start: 2019-08-20 | End: 2019-08-20

## 2019-08-20 RX ORDER — MAGNESIUM SULFATE HEPTAHYDRATE 40 MG/ML
2 INJECTION, SOLUTION INTRAVENOUS
Status: COMPLETED | OUTPATIENT
Start: 2019-08-20 | End: 2019-08-20

## 2019-08-20 RX ADMIN — SENNOSIDES,DOCUSATE SODIUM 1 TABLET: 8.6; 5 TABLET, FILM COATED ORAL at 10:08

## 2019-08-20 RX ADMIN — SENNOSIDES,DOCUSATE SODIUM 1 TABLET: 8.6; 5 TABLET, FILM COATED ORAL at 09:08

## 2019-08-20 RX ADMIN — HYDRALAZINE HYDROCHLORIDE 25 MG: 25 TABLET, FILM COATED ORAL at 03:08

## 2019-08-20 RX ADMIN — DIAZEPAM 10 MG: 5 INJECTION, SOLUTION INTRAMUSCULAR; INTRAVENOUS at 09:08

## 2019-08-20 RX ADMIN — MAGNESIUM SULFATE IN WATER 2 G: 40 INJECTION, SOLUTION INTRAVENOUS at 05:08

## 2019-08-20 RX ADMIN — SERTRALINE HYDROCHLORIDE 25 MG: 25 TABLET ORAL at 09:08

## 2019-08-20 RX ADMIN — ONDANSETRON 4 MG: 2 INJECTION INTRAMUSCULAR; INTRAVENOUS at 09:08

## 2019-08-20 RX ADMIN — POTASSIUM CHLORIDE 40 MEQ: 20 TABLET, EXTENDED RELEASE ORAL at 02:08

## 2019-08-20 RX ADMIN — SODIUM CHLORIDE 1000 ML: 0.9 INJECTION, SOLUTION INTRAVENOUS at 04:08

## 2019-08-20 RX ADMIN — ACETAMINOPHEN 650 MG: 325 TABLET ORAL at 05:08

## 2019-08-20 RX ADMIN — LORAZEPAM 2 MG: 0.5 TABLET ORAL at 01:08

## 2019-08-20 RX ADMIN — FOLIC ACID 1 MG: 1 TABLET ORAL at 02:08

## 2019-08-20 RX ADMIN — DIAZEPAM 10 MG: 5 INJECTION, SOLUTION INTRAMUSCULAR; INTRAVENOUS at 10:08

## 2019-08-20 RX ADMIN — LORAZEPAM 2 MG: 0.5 TABLET ORAL at 08:08

## 2019-08-20 RX ADMIN — DIAZEPAM 10 MG: 5 INJECTION, SOLUTION INTRAMUSCULAR; INTRAVENOUS at 03:08

## 2019-08-20 RX ADMIN — DIAZEPAM 10 MG: 5 INJECTION, SOLUTION INTRAMUSCULAR; INTRAVENOUS at 04:08

## 2019-08-20 RX ADMIN — Medication 100 MG: at 02:08

## 2019-08-20 RX ADMIN — MORPHINE SULFATE 4 MG: 4 INJECTION INTRAVENOUS at 05:08

## 2019-08-20 RX ADMIN — Medication 10 ML: at 03:08

## 2019-08-20 RX ADMIN — ONDANSETRON 4 MG: 2 INJECTION INTRAMUSCULAR; INTRAVENOUS at 05:08

## 2019-08-20 RX ADMIN — SODIUM CHLORIDE: 0.9 INJECTION, SOLUTION INTRAVENOUS at 09:08

## 2019-08-20 RX ADMIN — LORAZEPAM 2 MG: 0.5 TABLET ORAL at 06:08

## 2019-08-20 RX ADMIN — ACETAMINOPHEN 650 MG: 325 TABLET ORAL at 12:08

## 2019-08-20 NOTE — HPI
History of Present Illness:  Patient is a 29 y.o. male with PMH of alcohol abuse over the past year who presents with nausea, vomiting, SOB, and shaking. He stated that he would like to stop drinking alcohol and that's why he came in. The patient and his father both started drinking heavily after his mother's death about a year ago. He drinks about 750 mL of vodka per day. His last drink was about 20 hrs ago. He is very worried that he is in liver failure as well and worried about his abdomen being very distended. He denies any fevers, chills, LOC, CP, diarrhea, or other symptoms. He was prescribed zoloft but has been off of it for about 3 weeks now.

## 2019-08-20 NOTE — ASSESSMENT & PLAN NOTE
Lab Results   Component Value Date    ALT 75 (H) 08/20/2019     (H) 08/20/2019    ALKPHOS 46 (L) 08/20/2019    BILITOT 1.1 (H) 08/20/2019     Encouraged to stop alcohol  Cont to monitor   Abd US: Hepatomegaly and hepatic steatosis.

## 2019-08-20 NOTE — ED NOTES
"Reports new onset of 5/10 "achy" right-sided cp without radiation. Pain is worse with deep breathing. Repeat EKG done. MD aware. Patient in NAD.  "

## 2019-08-20 NOTE — ASSESSMENT & PLAN NOTE
ALCOHOL/SEDATIVE WITHDRAWAL PRECAUTIONS   Cont multivitamin, thiamine 100 mg, and folate 1 mg daily   Monitor vital signs and CIWA q4 hours   Ativan 2 mg q4 hours prn SBP> 160 and HR> 110. Both parameters must be met  Valium 5 mg IV q8h for now   Electrolyte repletion PRN  Patient motivated to quit; will provide with resources

## 2019-08-20 NOTE — ED PROVIDER NOTES
Source of History:  patient    Chief complaint:  Alcohol Problem (c/o n/v and abd pain x 12 hours. last ETOH beverage was 24 hours ago. Normally drinks 1/5 of vodka a day )      HPI:  Darryn Frias is a 29 y.o. male presenting with shakes, nausea and vomiting associated with abdominal pain for the last 12 hr.  Patient has not drank in the last 24 hr.  He states the last time he had nothing to drink for 24 hr was 2 years ago, at which time he has periods to tremors and nausea/vomiting similar to today's.  He denies any other drug use.  Denies a history of seizures or hallucinations from alcohol withdrawal.  States he also has right upper quadrant pain which is mild, intermittent, nonradiating.  Not associated with any activity.  He is also complaining of 5/10 right-sided burning chest pain that is worse with a deep breath that began after arrival in the ED.    ROS: As per HPI and below:    General: No fever.  No chills.  Eyes: No visual changes.  Head: No headache.    Integument: No rashes or lesions.  Chest: No shortness of breath.  Cardiovascular: No chest pain.  Abdomen abdominal pain and nausea associated with vomiting   Urinary: No abnormal urination.  Neurologic: No focal weakness.  No numbness.  Hematologic: No easy bruising.  Endocrine: No excessive thirst or urination.      Review of patient's allergies indicates:  No Known Allergies    No current facility-administered medications on file prior to encounter.      Current Outpatient Medications on File Prior to Encounter   Medication Sig Dispense Refill    LORazepam (ATIVAN) 1 MG tablet Take 1 mg by mouth every 6 (six) hours as needed for Anxiety.      sertraline (ZOLOFT) 25 MG tablet Take 25 mg by mouth once daily.      ondansetron (ZOFRAN-ODT) 4 MG TbDL Take 1 tablet (4 mg total) by mouth every 8 (eight) hours as needed. 12 tablet 0       PMH:  As per HPI and below:  Past Medical History:   Diagnosis Date    Anxiety     Depression      Hypertension      Past Surgical History:   Procedure Laterality Date    WISDOM TOOTH EXTRACTION         Social History     Socioeconomic History    Marital status: Single     Spouse name: Not on file    Number of children: Not on file    Years of education: Not on file    Highest education level: Not on file   Occupational History    Not on file   Social Needs    Financial resource strain: Not on file    Food insecurity:     Worry: Not on file     Inability: Not on file    Transportation needs:     Medical: Not on file     Non-medical: Not on file   Tobacco Use    Smoking status: Never Smoker    Smokeless tobacco: Never Used   Substance and Sexual Activity    Alcohol use: Yes     Comment: 2-3 drinks with vodka/ice tea day 12ozes    Drug use: No    Sexual activity: Not on file   Lifestyle    Physical activity:     Days per week: Not on file     Minutes per session: Not on file    Stress: Not on file   Relationships    Social connections:     Talks on phone: Not on file     Gets together: Not on file     Attends Christianity service: Not on file     Active member of club or organization: Not on file     Attends meetings of clubs or organizations: Not on file     Relationship status: Not on file   Other Topics Concern    Not on file   Social History Narrative    Not on file       No family history on file.    Physical Exam:    Vitals:    08/20/19 0345   BP: (!) 141/85   Pulse: (!) 114   Resp: (!) 22   Temp:      Appearance: No acute distress, mildly diaphoretic, tremulous Skin: No rashes seen.  Good turgor.  No abrasions.  No ecchymoses.  Eyes: No conjunctival injection.  ENT: Oropharynx clear.    Chest: Clear to auscultation bilaterally.  Good air movement.  No wheezes.  No rhonchi.  Cardiovascular: Regular rate and rhythm.  No murmurs. No gallops. No rubs.  Abdomen: Soft.  Not distended.  Nontender.  No guarding.  No rebound. No Masses  Musculoskeletal: Good range of motion all joints.  No  deformities.  Neck supple.  No meningismus.  Neurologic:  Tremulous movements, Moves all extremities.  Normal sensation.  No facial droop.  Normal speech.    Mental Status:  Alert and oriented x 3.  Appropriate, conversant.          Laboratory Studies:  Labs Reviewed   CBC W/ AUTO DIFFERENTIAL   COMPREHENSIVE METABOLIC PANEL   MAGNESIUM   LIPASE   PHOSPHORUS       EKG:  sinus tachycardia rate 108, no ST changes, normal intervals, no T-wave inversions, compared to previous from 2017 no significant change.  X-rays:  On my interpretation no pneumonia or effusion  Chart reviewed:  History of admissions for alcoholism and psychiatric disorders    Imaging Results    None         Medications Given:  Medications   diazePAM injection 10 mg (10 mg Intravenous Given 8/20/19 0356)       Discussed with:  [facility] medicine    MDM:    29 y.o. male with abdominal pain, nausea, vomiting, tachycardia concerning for acute withdrawal.  Patient given 10 mg IV diazepam with improvement in his diaphoresis, however continues to be tachycardic and tremulous.  Given fluids and an additional 10 mg of diazepam, his tremors have improved, however he remains tachycardic.  Labs show an anion gap, elevated liver enzymes, there is concern for alcoholic ketoacidosis.  Patient will be admitted for further treatment.  Patient initially complaining of abdominal pain, now complaining of chest pain, do not suspect acute coronary syndrome, troponin pending.  EKG reassuring.  Do not suspect cholecystitis, as patient is afebrile and this more likely represents pain from gastritis due to excessive alcohol abuse in this clinical setting.    Critical Care Time    Critical care time was provided for 35 minutes exclusive of other billable procedures and teaching time for the support of metabolic organ system where the potential for death, shock, or further decline was possible. Critical care time can include documentation, discussion with consultants, developing  a care plan, as well as direct patient care.    Diagnostic Impression:    1. Chest pain    2. Alcohol withdrawal               Scar Hernandez MD  08/20/19 0535       Scar Hernandez MD  08/20/19 0535

## 2019-08-20 NOTE — ED NOTES
LOC: The patient is awake, alert, and oriented to place, time, situation. Affect is appropriate.  Speech is appropriate and clear.     APPEARANCE: Patient resting comfortably in no acute distress.  Patient is disheveled, un bathed, and soiled with emesis.     SKIN: The skin is warm and dry; face is flushed.  Patient has normal skin turgor and moist mucus membranes.  Skin intact; no breakdown, rash or bruising noted.     MUSCULOSKELETAL: Patient moving upper and lower extremities without difficulty.  Denies weakness and fatigue. Denies pain.    RESPIRATORY: Airway is open and patent. Respirations spontaneous, even, easy, and non-labored.  Patient has a normal effort and rate.  No accessory muscle use noted. Denies cough. Patient denies sob.    CARDIAC: Patient sinus tach 112 bpm.  No peripheral edema noted. No complaints of chest pain.     ABDOMEN: Soft and non tender to palpation. Patient reports recent bloating. Patient reports [id number] n/v without blood. Reports RUQ pain. Reports x1 episode of diarrhea this morning. Last PO intake 15 hours ago.     NEUROLOGIC: Eyes open spontaneously.  Behavior appropriate to situation.  Follows commands; facial expression symmetrical.  Purposeful motor response noted; normal sensation in all extremities. Patient denies headache. Reports dizziness. Patient tremulous. Last alcoholic beverage >24 hours ago. Denies hx withdrawal seizures.

## 2019-08-20 NOTE — PLAN OF CARE
08/20/19 1510   Post-Acute Status   Post-Acute Authorization Other   Other Status No Post-Acute Service Needs   Discharge Delays None known at this time

## 2019-08-20 NOTE — H&P
[facility] Hospital Medicine  History & Physical    Patient Name: Darryn Frias  MRN: [id number]  Admission Date: 8/20/2019  Attending Physician: Carmen Corea MD  Primary Care Provider: Devante Jon MD    [facility] Medicine Team: [facility] MED  Carmen Corea MD     Patient information was obtained from patient and ER records.     Subjective:     Principal Problem:Alcohol withdrawal    Chief Complaint:   Chief Complaint   Patient presents with    Alcohol Problem     c/o n/v and abd pain x 12 hours. last ETOH beverage was 24 hours ago. Normally drinks 1/5 of vodka a day         HPI: History of Present Illness:  Patient is a 29 y.o. male with PMH of alcohol abuse over the past year who presents with nausea, vomiting, SOB, and shaking. He stated that he would like to stop drinking alcohol and that's why he came in. The patient and his father both started drinking heavily after his mother's death about a year ago. He drinks about 750 mL of vodka per day. His last drink was about 20 hrs ago. He is very worried that he is in liver failure as well and worried about his abdomen being very distended. He denies any fevers, chills, LOC, CP, diarrhea, or other symptoms. He was prescribed zoloft but has been off of it for about 3 weeks now.             Past Medical History:   Diagnosis Date    Anxiety     Depression     Hypertension        Past Surgical History:   Procedure Laterality Date    WISDOM TOOTH EXTRACTION         Review of patient's allergies indicates:  No Known Allergies    No current facility-administered medications on file prior to encounter.      Current Outpatient Medications on File Prior to Encounter   Medication Sig    LORazepam (ATIVAN) 1 MG tablet Take 1 mg by mouth every 6 (six) hours as needed for Anxiety.    sertraline (ZOLOFT) 25 MG tablet Take 25 mg by mouth once daily.    ondansetron (ZOFRAN-ODT) 4 MG TbDL Take 1 tablet (4 mg total) by mouth every 8 (eight)  hours as needed.     Family History     None        Tobacco Use    Smoking status: Never Smoker    Smokeless tobacco: Never Used   Substance and Sexual Activity    Alcohol use: Yes     Comment: 2-3 drinks with vodka/ice tea day 12ozes    Drug use: No    Sexual activity: Not on file     Review of Systems   Constitutional: Positive for fatigue. Negative for chills and fever.   HENT: Negative for congestion, sinus pressure, sneezing, sore throat and trouble swallowing.    Eyes: Negative for visual disturbance.   Respiratory: Positive for shortness of breath. Negative for cough, chest tightness and wheezing.    Gastrointestinal: Positive for abdominal distention, nausea and vomiting. Negative for abdominal pain and constipation.   Endocrine: Negative for polyphagia and polyuria.   Genitourinary: Negative for difficulty urinating, flank pain, frequency and hematuria.   Musculoskeletal: Negative for arthralgias.   Skin: Negative for rash.   Neurological: Positive for tremors. Negative for seizures, syncope, light-headedness, numbness and headaches.   Hematological: Does not bruise/bleed easily.   Psychiatric/Behavioral: Negative for hallucinations and self-injury. The patient is nervous/anxious.      Objective:     Vital Signs (Most Recent):  Temp: 97.4 °F (36.3 °C) (08/20/19 1138)  Pulse: (!) 113 (08/20/19 1147)  Resp: 18 (08/20/19 1138)  BP: (!) 182/118 (08/20/19 1138)  SpO2: 96 % (08/20/19 1147) Vital Signs (24h Range):  Temp:  [97.4 °F (36.3 °C)-99.1 °F (37.3 °C)] 97.4 °F (36.3 °C)  Pulse:  [105-128] 113  Resp:  [13-22] 18  SpO2:  [93 %-99 %] 96 %  BP: (130-182)/() 182/118        There is no height or weight on file to calculate BMI.    Physical Exam   Constitutional: He is oriented to person, place, and time.   Obese young male. Appears anxious. Coherent but very tremulous.   HENT:   Head: Normocephalic and atraumatic.   Eyes: No scleral icterus.   Neck: Neck supple.   Cardiovascular: Regular rhythm and  normal heart sounds.   tachycardic   Pulmonary/Chest: Effort normal and breath sounds normal. No respiratory distress.   Abdominal: Soft. Bowel sounds are normal. He exhibits distension (but not tense ). He exhibits no mass. There is no tenderness. There is no rebound and no guarding.   Musculoskeletal: He exhibits no edema.   Neurological: He is alert and oriented to person, place, and time.   Skin: No erythema.   Psychiatric: He has a normal mood and affect. His behavior is normal. Thought content normal.   Vitals reviewed.          Significant Labs: All pertinent labs within the past 24 hours have been reviewed.    Significant Imaging: I have reviewed all pertinent imaging results/findings within the past 24 hours.    Assessment/Plan:     * Alcohol withdrawal  ALCOHOL/SEDATIVE WITHDRAWAL PRECAUTIONS   Cont multivitamin, thiamine 100 mg, and folate 1 mg daily   Monitor vital signs and CIWA q4 hours   Ativan 2 mg q4 hours prn SBP> 160 and HR> 110. Both parameters must be met  Valium 5 mg IV q8h for now   Electrolyte repletion PRN  Patient motivated to quit; will provide with resources     Anxiety and depression  Patient stopped taking zoloft about 2-3 weeks ago  Will resume and monitor closely       Alcoholic hepatitis without ascites  Lab Results   Component Value Date    ALT 75 (H) 08/20/2019     (H) 08/20/2019    ALKPHOS 46 (L) 08/20/2019    BILITOT 1.1 (H) 08/20/2019     Encouraged to stop alcohol  Cont to monitor   Abd US: Hepatomegaly and hepatic steatosis.      VTE Risk Mitigation (From admission, onward)        Ordered     IP VTE HIGH RISK PATIENT  Once      08/20/19 0742     Place OMAR hose  Until discontinued      08/20/19 0632             Carmen Corea MD  Department of Hospital Medicine   [facility]

## 2019-08-20 NOTE — PLAN OF CARE
[facility] ([place]) - [place] - [address] [place] [address]  [address] [place] [address]  [place] [address]  Phone: [phone] Fax: [phone]    Payor: MEDICAID / Plan: [place] / Product Type: Managed Medicaid /         08/20/19 1503   Discharge Assessment   Assessment Type Discharge Planning Assessment   Confirmed/corrected address and phone number on facesheet? Yes   Assessment information obtained from? Patient   Expected Length of Stay (days) 3   Communicated expected length of stay with patient/caregiver yes   Prior to hospitilization cognitive status: Alert/Oriented   Prior to hospitalization functional status: Independent   Current cognitive status: Alert/Oriented   Current Functional Status: Independent   Lives With parent(s)  (Patient lives with his father.)   Able to Return to Prior Arrangements yes   Is patient able to care for self after discharge? Yes   Who are your caregiver(s) and their phone number(s)? Jah Cardoso (father) [phone]   Patient's perception of discharge disposition home or selfcare   Readmission Within the Last 30 Days no previous admission in last 30 days   Patient currently being followed by outpatient case management? No   Patient currently receives any other outside agency services? No   Equipment Currently Used at Home none   Do you have any problems affording any of your prescribed medications? No   Is the patient taking medications as prescribed? yes   Does the patient have transportation home? Yes   Transportation Anticipated other (see comments)  ([employer])   Does the patient receive services at the [facility]? No   Discharge Plan A Home with family   DME Needed Upon Discharge  none   Patient/Family in Agreement with Plan yes

## 2019-08-20 NOTE — ASSESSMENT & PLAN NOTE
ALCOHOL/SEDATIVE WITHDRAWAL PRECAUTIONS   Cont multivitamin, thiamine 100 mg, and folate 1 mg daily   Monitor vital signs q4 hours   Ativan 2 mg q4 hours prn SBP> 160 and HR> 110. Both parameters must be met.   Valium taper: 5 mg q 6 hours x 4 doses, then q 8 hours x 3 doses, then q 12 hours x 3 doses

## 2019-08-20 NOTE — ED TRIAGE NOTES
Pt reports x15 hr n/v. Reports drinking 2 pints alcohol per day. Last drink 24 hours ago. Received 8mg zofran by ems. Pt reports 5/10 RUQ pain and bloating. Pt is tremulous. Denies hx seizures from alcohol withdrawal.

## 2019-08-20 NOTE — SUBJECTIVE & OBJECTIVE
Past Medical History:   Diagnosis Date    Anxiety     Depression     Hypertension        Past Surgical History:   Procedure Laterality Date    WISDOM TOOTH EXTRACTION         Review of patient's allergies indicates:  No Known Allergies    No current facility-administered medications on file prior to encounter.      Current Outpatient Medications on File Prior to Encounter   Medication Sig    LORazepam (ATIVAN) 1 MG tablet Take 1 mg by mouth every 6 (six) hours as needed for Anxiety.    sertraline (ZOLOFT) 25 MG tablet Take 25 mg by mouth once daily.    ondansetron (ZOFRAN-ODT) 4 MG TbDL Take 1 tablet (4 mg total) by mouth every 8 (eight) hours as needed.     Family History     None        Tobacco Use    Smoking status: Never Smoker    Smokeless tobacco: Never Used   Substance and Sexual Activity    Alcohol use: Yes     Comment: 2-3 drinks with vodka/ice tea day 12ozes    Drug use: No    Sexual activity: Not on file     Review of Systems   Constitutional: Positive for fatigue. Negative for chills and fever.   HENT: Negative for congestion, sinus pressure, sneezing, sore throat and trouble swallowing.    Eyes: Negative for visual disturbance.   Respiratory: Positive for shortness of breath. Negative for cough, chest tightness and wheezing.    Gastrointestinal: Positive for abdominal distention, nausea and vomiting. Negative for abdominal pain and constipation.   Endocrine: Negative for polyphagia and polyuria.   Genitourinary: Negative for difficulty urinating, flank pain, frequency and hematuria.   Musculoskeletal: Negative for arthralgias.   Skin: Negative for rash.   Neurological: Positive for tremors. Negative for seizures, syncope, light-headedness, numbness and headaches.   Hematological: Does not bruise/bleed easily.   Psychiatric/Behavioral: Negative for hallucinations and self-injury. The patient is nervous/anxious.      Objective:     Vital Signs (Most Recent):  Temp: 97.4 °F (36.3 °C) (08/20/19  1138)  Pulse: (!) 113 (08/20/19 1147)  Resp: 18 (08/20/19 1138)  BP: (!) 182/118 (08/20/19 1138)  SpO2: 96 % (08/20/19 1147) Vital Signs (24h Range):  Temp:  [97.4 °F (36.3 °C)-99.1 °F (37.3 °C)] 97.4 °F (36.3 °C)  Pulse:  [105-128] 113  Resp:  [13-22] 18  SpO2:  [93 %-99 %] 96 %  BP: (130-182)/() 182/118        There is no height or weight on file to calculate BMI.    Physical Exam   Constitutional: He is oriented to person, place, and time.   Obese young male. Appears anxious. Coherent but very tremulous.   HENT:   Head: Normocephalic and atraumatic.   Eyes: No scleral icterus.   Neck: Neck supple.   Cardiovascular: Regular rhythm and normal heart sounds.   tachycardic   Pulmonary/Chest: Effort normal and breath sounds normal. No respiratory distress.   Abdominal: Soft. Bowel sounds are normal. He exhibits distension (but not tense ). He exhibits no mass. There is no tenderness. There is no rebound and no guarding.   Musculoskeletal: He exhibits no edema.   Neurological: He is alert and oriented to person, place, and time.   Skin: No erythema.   Psychiatric: He has a normal mood and affect. His behavior is normal. Thought content normal.   Vitals reviewed.          Significant Labs: All pertinent labs within the past 24 hours have been reviewed.    Significant Imaging: I have reviewed all pertinent imaging results/findings within the past 24 hours.

## 2019-08-21 LAB
ALBUMIN SERPL BCP-MCNC: 3.7 G/DL (ref 3.5–5.2)
ALP SERPL-CCNC: 47 U/L (ref 55–135)
ALT SERPL W/O P-5'-P-CCNC: 56 U/L (ref 10–44)
ANION GAP SERPL CALC-SCNC: 11 MMOL/L (ref 8–16)
AST SERPL-CCNC: 83 U/L (ref 10–40)
BASOPHILS # BLD AUTO: 0.02 K/UL (ref 0–0.2)
BASOPHILS NFR BLD: 0.4 % (ref 0–1.9)
BILIRUB SERPL-MCNC: 1.6 MG/DL (ref 0.1–1)
BUN SERPL-MCNC: 9 MG/DL (ref 6–20)
CALCIUM SERPL-MCNC: 9.9 MG/DL (ref 8.7–10.5)
CHLORIDE SERPL-SCNC: 101 MMOL/L (ref 95–110)
CK SERPL-CCNC: 207 U/L (ref 20–200)
CO2 SERPL-SCNC: 26 MMOL/L (ref 23–29)
CREAT SERPL-MCNC: 0.7 MG/DL (ref 0.5–1.4)
DIFFERENTIAL METHOD: ABNORMAL
EOSINOPHIL # BLD AUTO: 0 K/UL (ref 0–0.5)
EOSINOPHIL NFR BLD: 0.4 % (ref 0–8)
ERYTHROCYTE [DISTWIDTH] IN BLOOD BY AUTOMATED COUNT: 12.9 % (ref 11.5–14.5)
EST. GFR  (AFRICAN AMERICAN): >60 ML/MIN/1.73 M^2
EST. GFR  (NON AFRICAN AMERICAN): >60 ML/MIN/1.73 M^2
GLUCOSE SERPL-MCNC: 108 MG/DL (ref 70–110)
HCT VFR BLD AUTO: 44.1 % (ref 40–54)
HGB BLD-MCNC: 14.7 G/DL (ref 14–18)
IMM GRANULOCYTES # BLD AUTO: 0.03 K/UL (ref 0–0.04)
IMM GRANULOCYTES NFR BLD AUTO: 0.7 % (ref 0–0.5)
INR PPP: 1 (ref 0.8–1.2)
LYMPHOCYTES # BLD AUTO: 0.7 K/UL (ref 1–4.8)
LYMPHOCYTES NFR BLD: 14.6 % (ref 18–48)
MAGNESIUM SERPL-MCNC: 1.8 MG/DL (ref 1.6–2.6)
MCH RBC QN AUTO: 33.1 PG (ref 27–31)
MCHC RBC AUTO-ENTMCNC: 33.3 G/DL (ref 32–36)
MCV RBC AUTO: 99 FL (ref 82–98)
MONOCYTES # BLD AUTO: 0.6 K/UL (ref 0.3–1)
MONOCYTES NFR BLD: 13.7 % (ref 4–15)
NEUTROPHILS # BLD AUTO: 3.2 K/UL (ref 1.8–7.7)
NEUTROPHILS NFR BLD: 70.2 % (ref 38–73)
NRBC BLD-RTO: 0 /100 WBC
PHOSPHATE SERPL-MCNC: 3.3 MG/DL (ref 2.7–4.5)
PLATELET # BLD AUTO: 86 K/UL (ref 150–350)
PMV BLD AUTO: 10.5 FL (ref 9.2–12.9)
POTASSIUM SERPL-SCNC: 4 MMOL/L (ref 3.5–5.1)
PROT SERPL-MCNC: 6.9 G/DL (ref 6–8.4)
PROTHROMBIN TIME: 10.2 SEC (ref 9–12.5)
RBC # BLD AUTO: 4.44 M/UL (ref 4.6–6.2)
SODIUM SERPL-SCNC: 138 MMOL/L (ref 136–145)
WBC # BLD AUTO: 4.51 K/UL (ref 3.9–12.7)

## 2019-08-21 PROCEDURE — 84100 ASSAY OF PHOSPHORUS: CPT

## 2019-08-21 PROCEDURE — 11000001 HC ACUTE MED/SURG PRIVATE ROOM

## 2019-08-21 PROCEDURE — 83735 ASSAY OF MAGNESIUM: CPT

## 2019-08-21 PROCEDURE — 25000003 PHARM REV CODE 250: Performed by: HOSPITALIST

## 2019-08-21 PROCEDURE — 85610 PROTHROMBIN TIME: CPT

## 2019-08-21 PROCEDURE — 82550 ASSAY OF CK (CPK): CPT

## 2019-08-21 PROCEDURE — 63600175 PHARM REV CODE 636 W HCPCS: Performed by: HOSPITALIST

## 2019-08-21 PROCEDURE — 85025 COMPLETE CBC W/AUTO DIFF WBC: CPT

## 2019-08-21 PROCEDURE — 36415 COLL VENOUS BLD VENIPUNCTURE: CPT

## 2019-08-21 PROCEDURE — 80053 COMPREHEN METABOLIC PANEL: CPT

## 2019-08-21 PROCEDURE — 99232 SBSQ HOSP IP/OBS MODERATE 35: CPT | Mod: ,,, | Performed by: HOSPITALIST

## 2019-08-21 PROCEDURE — 99232 PR SUBSEQUENT HOSPITAL CARE,LEVL II: ICD-10-PCS | Mod: ,,, | Performed by: HOSPITALIST

## 2019-08-21 RX ORDER — DIAZEPAM 10 MG/2ML
5 INJECTION INTRAMUSCULAR EVERY 8 HOURS
Status: DISCONTINUED | OUTPATIENT
Start: 2019-08-21 | End: 2019-08-22

## 2019-08-21 RX ADMIN — DIAZEPAM 10 MG: 5 INJECTION, SOLUTION INTRAMUSCULAR; INTRAVENOUS at 06:08

## 2019-08-21 RX ADMIN — SERTRALINE HYDROCHLORIDE 25 MG: 25 TABLET ORAL at 08:08

## 2019-08-21 RX ADMIN — Medication 100 MG: at 08:08

## 2019-08-21 RX ADMIN — DIAZEPAM 5 MG: 5 INJECTION, SOLUTION INTRAMUSCULAR; INTRAVENOUS at 01:08

## 2019-08-21 RX ADMIN — DIAZEPAM 5 MG: 5 INJECTION, SOLUTION INTRAMUSCULAR; INTRAVENOUS at 09:08

## 2019-08-21 RX ADMIN — ONDANSETRON 4 MG: 2 INJECTION INTRAMUSCULAR; INTRAVENOUS at 08:08

## 2019-08-21 RX ADMIN — ONDANSETRON 4 MG: 2 INJECTION INTRAMUSCULAR; INTRAVENOUS at 04:08

## 2019-08-21 RX ADMIN — SENNOSIDES,DOCUSATE SODIUM 1 TABLET: 8.6; 5 TABLET, FILM COATED ORAL at 08:08

## 2019-08-21 RX ADMIN — LORAZEPAM 2 MG: 0.5 TABLET ORAL at 01:08

## 2019-08-21 RX ADMIN — Medication 10 ML: at 08:08

## 2019-08-21 RX ADMIN — ACETAMINOPHEN 650 MG: 325 TABLET ORAL at 04:08

## 2019-08-21 RX ADMIN — ACETAMINOPHEN 650 MG: 325 TABLET ORAL at 12:08

## 2019-08-21 RX ADMIN — FOLIC ACID 1 MG: 1 TABLET ORAL at 08:08

## 2019-08-21 RX ADMIN — SENNOSIDES,DOCUSATE SODIUM 1 TABLET: 8.6; 5 TABLET, FILM COATED ORAL at 09:08

## 2019-08-21 NOTE — SUBJECTIVE & OBJECTIVE
Interval History: No acute issues overnight. Awake and alert. Tremors have resolved. Tolerating diet without N/V.     Review of Systems   Constitutional: Negative for chills and fever.   HENT: Negative for congestion.    Respiratory: Negative for cough and shortness of breath.    Cardiovascular: Negative for chest pain and palpitations.   Gastrointestinal: Negative for abdominal distention and abdominal pain.   Genitourinary: Negative.    Musculoskeletal: Negative.    Psychiatric/Behavioral: Negative.      Objective:     Vital Signs (Most Recent):  Temp: 97.8 °F (36.6 °C) (08/21/19 0823)  Pulse: 104 (08/21/19 0823)  Resp: 18 (08/21/19 0823)  BP: (!) 161/109 (08/21/19 0823)  SpO2: 98 % (08/21/19 0823) Vital Signs (24h Range):  Temp:  [97.4 °F (36.3 °C)-98 °F (36.7 °C)] 97.8 °F (36.6 °C)  Pulse:  [] 104  Resp:  [16-18] 18  SpO2:  [94 %-99 %] 98 %  BP: (161-182)/() 161/109        There is no height or weight on file to calculate BMI.    Intake/Output Summary (Last 24 hours) at 8/21/2019 1120  Last data filed at 8/21/2019 0900  Gross per 24 hour   Intake 1320 ml   Output --   Net 1320 ml      Physical Exam   Constitutional: He is oriented to person, place, and time. He appears well-developed and well-nourished. No distress.   HENT:   Head: Normocephalic and atraumatic.   Eyes: Pupils are equal, round, and reactive to light. No scleral icterus.   Neck: Normal range of motion.   Cardiovascular: Normal rate, regular rhythm and normal heart sounds.   No murmur heard.  Pulmonary/Chest: Effort normal and breath sounds normal. No respiratory distress. He has no wheezes.   Abdominal: Soft. Bowel sounds are normal. He exhibits no distension. There is no tenderness.   Musculoskeletal: He exhibits no edema or deformity.   Neurological: He is alert and oriented to person, place, and time.   Skin: Skin is warm and dry. He is not diaphoretic. No erythema.   Psychiatric: He has a normal mood and affect. His behavior is  normal. Judgment and thought content normal.       Significant Labs: All pertinent labs within the past 24 hours have been reviewed.    Significant Imaging: I have reviewed all pertinent imaging results/findings within the past 24 hours.

## 2019-08-21 NOTE — PROGRESS NOTES
[facility] Hospital Medicine  Progress Note    Patient Name: Darryn Frias  MRN: [id number]  Patient Class: IP- Inpatient   Admission Date: 8/20/2019  Length of Stay: 1 days  Attending Physician: Carmen Corea MD  Primary Care Provider: No primary care provider on file.    [facility] [place] [facility] [place] Ute Dorantes MD    Subjective:     Principal Problem:Alcohol withdrawal      HPI:  History of Present Illness:  Patient is a 29 y.o. male with PMH of alcohol abuse over the past year who presents with nausea, vomiting, SOB, and shaking. He stated that he would like to stop drinking alcohol and that's why he came in. The patient and his father both started drinking heavily after his mother's death about a year ago. He drinks about 750 mL of vodka per day. His last drink was about 20 hrs ago. He is very worried that he is in liver failure as well and worried about his abdomen being very distended. He denies any fevers, chills, LOC, CP, diarrhea, or other symptoms. He was prescribed zoloft but has been off of it for about 3 weeks now.     Overview/Hospital Course:  No notes on file    Interval History: No acute issues overnight. Awake and alert. Tremors have resolved. Tolerating diet without N/V.     Review of Systems   Constitutional: Negative for chills and fever.   HENT: Negative for congestion.    Respiratory: Negative for cough and shortness of breath.    Cardiovascular: Negative for chest pain and palpitations.   Gastrointestinal: Negative for abdominal distention and abdominal pain.   Genitourinary: Negative.    Musculoskeletal: Negative.    Psychiatric/Behavioral: Negative.      Objective:     Vital Signs (Most Recent):  Temp: 97.8 °F (36.6 °C) (08/21/19 0823)  Pulse: 104 (08/21/19 0823)  Resp: 18 (08/21/19 0823)  BP: (!) 161/109 (08/21/19 0823)  SpO2: 98 % (08/21/19 0823) Vital Signs (24h Range):  Temp:  [97.4 °F (36.3 °C)-98 °F (36.7 °C)] 97.8 °F (36.6 °C)  Pulse:   [] 104  Resp:  [16-18] 18  SpO2:  [94 %-99 %] 98 %  BP: (161-182)/() 161/109        There is no height or weight on file to calculate BMI.    Intake/Output Summary (Last 24 hours) at 8/21/2019 1120  Last data filed at 8/21/2019 0900  Gross per 24 hour   Intake 1320 ml   Output --   Net 1320 ml      Physical Exam   Constitutional: He is oriented to person, place, and time. He appears well-developed and well-nourished. No distress.   HENT:   Head: Normocephalic and atraumatic.   Eyes: Pupils are equal, round, and reactive to light. No scleral icterus.   Neck: Normal range of motion.   Cardiovascular: Normal rate, regular rhythm and normal heart sounds.   No murmur heard.  Pulmonary/Chest: Effort normal and breath sounds normal. No respiratory distress. He has no wheezes.   Abdominal: Soft. Bowel sounds are normal. He exhibits no distension. There is no tenderness.   Musculoskeletal: He exhibits no edema or deformity.   Neurological: He is alert and oriented to person, place, and time.   Skin: Skin is warm and dry. He is not diaphoretic. No erythema.   Psychiatric: He has a normal mood and affect. His behavior is normal. Judgment and thought content normal.       Significant Labs: All pertinent labs within the past 24 hours have been reviewed.    Significant Imaging: I have reviewed all pertinent imaging results/findings within the past 24 hours.      Assessment/Plan:      * Alcohol withdrawal  ALCOHOL/SEDATIVE WITHDRAWAL PRECAUTIONS   Cont multivitamin, thiamine 100 mg, and folate 1 mg daily   Monitor vital signs and CIWA q4 hours   Ativan 2 mg q4 hours prn SBP> 160 and HR> 110. Both parameters must be met  Valium 5 mg IV q8h for now   Electrolyte repletion PRN  Patient motivated to quit; will provide with resources     Anxiety and depression  Patient stopped taking zoloft about 2-3 weeks ago  Will resume and monitor closely       Alcoholic hepatitis without ascites  Lab Results   Component Value Date     ALT 56 (H) 08/21/2019    AST 83 (H) 08/21/2019    ALKPHOS 47 (L) 08/21/2019    BILITOT 1.6 (H) 08/21/2019     Encouraged to stop alcohol  Cont to monitor  Improving.    Abd US: Hepatomegaly and hepatic steatosis.      VTE Risk Mitigation (From admission, onward)        Ordered     IP VTE HIGH RISK PATIENT  Once      08/20/19 0742     Place OMAR hose  Until discontinued      08/20/19 0648            Ute Dorantes MD  Department of Hospital Medicine   [facility]

## 2019-08-21 NOTE — ASSESSMENT & PLAN NOTE
Lab Results   Component Value Date    ALT 56 (H) 08/21/2019    AST 83 (H) 08/21/2019    ALKPHOS 47 (L) 08/21/2019    BILITOT 1.6 (H) 08/21/2019     Encouraged to stop alcohol  Cont to monitor  Improving.    Abd US: Hepatomegaly and hepatic steatosis.

## 2019-08-22 LAB
ALBUMIN SERPL BCP-MCNC: 4.1 G/DL (ref 3.5–5.2)
ALP SERPL-CCNC: 42 U/L (ref 55–135)
ALT SERPL W/O P-5'-P-CCNC: 49 U/L (ref 10–44)
ANION GAP SERPL CALC-SCNC: 15 MMOL/L (ref 8–16)
AST SERPL-CCNC: 55 U/L (ref 10–40)
BASOPHILS # BLD AUTO: 0.03 K/UL (ref 0–0.2)
BASOPHILS NFR BLD: 0.7 % (ref 0–1.9)
BILIRUB SERPL-MCNC: 1.4 MG/DL (ref 0.1–1)
BUN SERPL-MCNC: 8 MG/DL (ref 6–20)
CALCIUM SERPL-MCNC: 10.2 MG/DL (ref 8.7–10.5)
CHLORIDE SERPL-SCNC: 101 MMOL/L (ref 95–110)
CO2 SERPL-SCNC: 22 MMOL/L (ref 23–29)
CREAT SERPL-MCNC: 0.7 MG/DL (ref 0.5–1.4)
DIFFERENTIAL METHOD: ABNORMAL
EOSINOPHIL # BLD AUTO: 0.1 K/UL (ref 0–0.5)
EOSINOPHIL NFR BLD: 1.6 % (ref 0–8)
ERYTHROCYTE [DISTWIDTH] IN BLOOD BY AUTOMATED COUNT: 13 % (ref 11.5–14.5)
EST. GFR  (AFRICAN AMERICAN): >60 ML/MIN/1.73 M^2
EST. GFR  (NON AFRICAN AMERICAN): >60 ML/MIN/1.73 M^2
GLUCOSE SERPL-MCNC: 99 MG/DL (ref 70–110)
HCT VFR BLD AUTO: 46.5 % (ref 40–54)
HGB BLD-MCNC: 15.2 G/DL (ref 14–18)
IMM GRANULOCYTES # BLD AUTO: 0.01 K/UL (ref 0–0.04)
IMM GRANULOCYTES NFR BLD AUTO: 0.2 % (ref 0–0.5)
INR PPP: 1 (ref 0.8–1.2)
LYMPHOCYTES # BLD AUTO: 1.1 K/UL (ref 1–4.8)
LYMPHOCYTES NFR BLD: 24.6 % (ref 18–48)
MAGNESIUM SERPL-MCNC: 1.8 MG/DL (ref 1.6–2.6)
MCH RBC QN AUTO: 32.6 PG (ref 27–31)
MCHC RBC AUTO-ENTMCNC: 32.7 G/DL (ref 32–36)
MCV RBC AUTO: 100 FL (ref 82–98)
MONOCYTES # BLD AUTO: 0.6 K/UL (ref 0.3–1)
MONOCYTES NFR BLD: 13.6 % (ref 4–15)
NEUTROPHILS # BLD AUTO: 2.6 K/UL (ref 1.8–7.7)
NEUTROPHILS NFR BLD: 59.3 % (ref 38–73)
NRBC BLD-RTO: 0 /100 WBC
PHOSPHATE SERPL-MCNC: 3.7 MG/DL (ref 2.7–4.5)
PLATELET # BLD AUTO: 90 K/UL (ref 150–350)
PMV BLD AUTO: 11.3 FL (ref 9.2–12.9)
POTASSIUM SERPL-SCNC: 3.6 MMOL/L (ref 3.5–5.1)
PROT SERPL-MCNC: 7.6 G/DL (ref 6–8.4)
PROTHROMBIN TIME: 10.7 SEC (ref 9–12.5)
RBC # BLD AUTO: 4.66 M/UL (ref 4.6–6.2)
SODIUM SERPL-SCNC: 138 MMOL/L (ref 136–145)
WBC # BLD AUTO: 4.35 K/UL (ref 3.9–12.7)

## 2019-08-22 PROCEDURE — 99232 PR SUBSEQUENT HOSPITAL CARE,LEVL II: ICD-10-PCS | Mod: ,,, | Performed by: HOSPITALIST

## 2019-08-22 PROCEDURE — 11000001 HC ACUTE MED/SURG PRIVATE ROOM

## 2019-08-22 PROCEDURE — 36415 COLL VENOUS BLD VENIPUNCTURE: CPT

## 2019-08-22 PROCEDURE — 85610 PROTHROMBIN TIME: CPT

## 2019-08-22 PROCEDURE — 85025 COMPLETE CBC W/AUTO DIFF WBC: CPT

## 2019-08-22 PROCEDURE — 99232 SBSQ HOSP IP/OBS MODERATE 35: CPT | Mod: ,,, | Performed by: HOSPITALIST

## 2019-08-22 PROCEDURE — 84100 ASSAY OF PHOSPHORUS: CPT

## 2019-08-22 PROCEDURE — 83735 ASSAY OF MAGNESIUM: CPT

## 2019-08-22 PROCEDURE — 25000003 PHARM REV CODE 250: Performed by: HOSPITALIST

## 2019-08-22 PROCEDURE — 80053 COMPREHEN METABOLIC PANEL: CPT

## 2019-08-22 PROCEDURE — 63600175 PHARM REV CODE 636 W HCPCS: Performed by: HOSPITALIST

## 2019-08-22 RX ORDER — DIAZEPAM 5 MG/1
5 TABLET ORAL 2 TIMES DAILY
Status: COMPLETED | OUTPATIENT
Start: 2019-08-22 | End: 2019-08-22

## 2019-08-22 RX ORDER — DIAZEPAM 5 MG/1
5 TABLET ORAL DAILY
Status: COMPLETED | OUTPATIENT
Start: 2019-08-23 | End: 2019-08-23

## 2019-08-22 RX ADMIN — Medication 10 ML: at 08:08

## 2019-08-22 RX ADMIN — DIAZEPAM 5 MG: 5 INJECTION, SOLUTION INTRAMUSCULAR; INTRAVENOUS at 05:08

## 2019-08-22 RX ADMIN — DIAZEPAM 5 MG: 5 TABLET ORAL at 08:08

## 2019-08-22 RX ADMIN — SENNOSIDES,DOCUSATE SODIUM 1 TABLET: 8.6; 5 TABLET, FILM COATED ORAL at 08:08

## 2019-08-22 RX ADMIN — ACETAMINOPHEN 650 MG: 325 TABLET ORAL at 03:08

## 2019-08-22 RX ADMIN — FOLIC ACID 1 MG: 1 TABLET ORAL at 08:08

## 2019-08-22 RX ADMIN — SERTRALINE HYDROCHLORIDE 25 MG: 25 TABLET ORAL at 08:08

## 2019-08-22 RX ADMIN — Medication 100 MG: at 08:08

## 2019-08-22 NOTE — PROGRESS NOTES
[facility] Hospital Medicine  Progress Note    Patient Name: Darryn Frias  MRN: [id number]  Patient Class: IP- Inpatient   Admission Date: 8/20/2019  Length of Stay: 2 days  Attending Physician: Ute Corea MD  Primary Care Provider: No primary care provider on file.    Hospital Medicine [place] [facility] [place] [facility] G Ute Dorantes MD    Subjective:     Principal Problem:Alcohol withdrawal        HPI:  History of Present Illness:  Patient is a 29 y.o. male with PMH of alcohol abuse over the past year who presents with nausea, vomiting, SOB, and shaking. He stated that he would like to stop drinking alcohol and that's why he came in. The patient and his father both started drinking heavily after his mother's death about a year ago. He drinks about 750 mL of vodka per day. His last drink was about 20 hrs ago. He is very worried that he is in liver failure as well and worried about his abdomen being very distended. He denies any fevers, chills, LOC, CP, diarrhea, or other symptoms. He was prescribed zoloft but has been off of it for about 3 weeks now.     Overview/Hospital Course:  No notes on file    Interval History: No acute issues overnight. Awake and alert. Tremors have resolved. Tolerating diet without N/V.     Review of Systems   Constitutional: Negative for chills and fever.   HENT: Negative for congestion.    Respiratory: Negative for cough and shortness of breath.    Cardiovascular: Negative for chest pain and palpitations.   Gastrointestinal: Negative for abdominal distention and abdominal pain.   Genitourinary: Negative.    Musculoskeletal: Negative.    Psychiatric/Behavioral: Negative.      Objective:     Vital Signs (Most Recent):  Temp: 97.8 °F (36.6 °C) (08/22/19 0857)  Pulse: (!) 115 (08/22/19 0857)  Resp: 16 (08/22/19 0857)  BP: (!) 133/93 (08/22/19 0857)  SpO2: 98 % (08/22/19 0857) Vital Signs (24h Range):  Temp:  [97 °F (36.1 °C)-98.1 °F (36.7 °C)] 97.8 °F (36.6 °C)  Pulse:   [] 115  Resp:  [16-20] 16  SpO2:  [95 %-98 %] 98 %  BP: (130-159)/(87-95) 133/93        There is no height or weight on file to calculate BMI.    Intake/Output Summary (Last 24 hours) at 8/22/2019 0933  Last data filed at 8/21/2019 1700  Gross per 24 hour   Intake 1280 ml   Output --   Net 1280 ml      Physical Exam   Constitutional: He is oriented to person, place, and time. He appears well-developed and well-nourished. No distress.   HENT:   Head: Normocephalic and atraumatic.   Eyes: Pupils are equal, round, and reactive to light. No scleral icterus.   Neck: Normal range of motion.   Cardiovascular: Normal rate, regular rhythm and normal heart sounds.   No murmur heard.  Pulmonary/Chest: Effort normal and breath sounds normal. No respiratory distress. He has no wheezes.   Abdominal: Soft. Bowel sounds are normal. He exhibits no distension. There is no tenderness.   Musculoskeletal: He exhibits no edema or deformity.   Neurological: He is alert and oriented to person, place, and time.   Skin: Skin is warm and dry. He is not diaphoretic. No erythema.   Psychiatric: He has a normal mood and affect. His behavior is normal. Judgment and thought content normal.       Significant Labs: All pertinent labs within the past 24 hours have been reviewed.    Significant Imaging: I have reviewed all pertinent imaging results/findings within the past 24 hours.      Assessment/Plan:      * Alcohol withdrawal  ALCOHOL/SEDATIVE WITHDRAWAL PRECAUTIONS   Cont multivitamin, thiamine 100 mg, and folate 1 mg daily   Monitor vital signs and CIWA q4 hours   Ativan 2 mg q4 hours prn SBP> 160 and HR> 110. Both parameters must be met  Valium 5 mg PO BID for now   Electrolyte repletion PRN  Patient motivated to quit; will provide with resources     Anxiety and depression  Patient stopped taking zoloft about 2-3 weeks ago  Will resume and monitor closely       Alcoholic hepatitis without ascites  Lab Results   Component Value Date    ALT  49 (H) 08/22/2019    AST 55 (H) 08/22/2019    ALKPHOS 42 (L) 08/22/2019    BILITOT 1.4 (H) 08/22/2019     Encouraged to stop alcohol  Cont to monitor  Improving.    Abd US: Hepatomegaly and hepatic steatosis.      VTE Risk Mitigation (From admission, onward)        Ordered     IP VTE HIGH RISK PATIENT  Once      08/20/19 0742     Place OMAR hose  Until discontinued      08/20/19 0648                Ute Dorantes MD  Department of Hospital Medicine   [facility]

## 2019-08-22 NOTE — ASSESSMENT & PLAN NOTE
ALCOHOL/SEDATIVE WITHDRAWAL PRECAUTIONS   Cont multivitamin, thiamine 100 mg, and folate 1 mg daily   Monitor vital signs and CIWA q4 hours   Ativan 2 mg q4 hours prn SBP> 160 and HR> 110. Both parameters must be met  Valium 5 mg PO BID for now   Electrolyte repletion PRN  Patient motivated to quit; will provide with resources

## 2019-08-22 NOTE — PLAN OF CARE
Problem: Adult Inpatient Plan of Care  Goal: Plan of Care Review  Outcome: Ongoing (interventions implemented as appropriate)  Patient AAOx4. Vitals WNL. CIWA and neuro checks done q4hrs per orders. No complaints of pain this shift. Diazepam IV push given q6hrs. Patient able to make needs known to staff. Bed in lowest position. Call light within reach. Monitoring.

## 2019-08-22 NOTE — ASSESSMENT & PLAN NOTE
Lab Results   Component Value Date    ALT 49 (H) 08/22/2019    AST 55 (H) 08/22/2019    ALKPHOS 42 (L) 08/22/2019    BILITOT 1.4 (H) 08/22/2019     Encouraged to stop alcohol  Cont to monitor  Improving.    Abd US: Hepatomegaly and hepatic steatosis.

## 2019-08-22 NOTE — SUBJECTIVE & OBJECTIVE
Interval History: No acute issues overnight. Awake and alert. Tremors have resolved. Tolerating diet without N/V.     Review of Systems   Constitutional: Negative for chills and fever.   HENT: Negative for congestion.    Respiratory: Negative for cough and shortness of breath.    Cardiovascular: Negative for chest pain and palpitations.   Gastrointestinal: Negative for abdominal distention and abdominal pain.   Genitourinary: Negative.    Musculoskeletal: Negative.    Psychiatric/Behavioral: Negative.      Objective:     Vital Signs (Most Recent):  Temp: 97.8 °F (36.6 °C) (08/22/19 0857)  Pulse: (!) 115 (08/22/19 0857)  Resp: 16 (08/22/19 0857)  BP: (!) 133/93 (08/22/19 0857)  SpO2: 98 % (08/22/19 0857) Vital Signs (24h Range):  Temp:  [97 °F (36.1 °C)-98.1 °F (36.7 °C)] 97.8 °F (36.6 °C)  Pulse:  [] 115  Resp:  [16-20] 16  SpO2:  [95 %-98 %] 98 %  BP: (130-159)/(87-95) 133/93        There is no height or weight on file to calculate BMI.    Intake/Output Summary (Last 24 hours) at 8/22/2019 0933  Last data filed at 8/21/2019 1700  Gross per 24 hour   Intake 1280 ml   Output --   Net 1280 ml      Physical Exam   Constitutional: He is oriented to person, place, and time. He appears well-developed and well-nourished. No distress.   HENT:   Head: Normocephalic and atraumatic.   Eyes: Pupils are equal, round, and reactive to light. No scleral icterus.   Neck: Normal range of motion.   Cardiovascular: Normal rate, regular rhythm and normal heart sounds.   No murmur heard.  Pulmonary/Chest: Effort normal and breath sounds normal. No respiratory distress. He has no wheezes.   Abdominal: Soft. Bowel sounds are normal. He exhibits no distension. There is no tenderness.   Musculoskeletal: He exhibits no edema or deformity.   Neurological: He is alert and oriented to person, place, and time.   Skin: Skin is warm and dry. He is not diaphoretic. No erythema.   Psychiatric: He has a normal mood and affect. His behavior is  normal. Judgment and thought content normal.       Significant Labs: All pertinent labs within the past 24 hours have been reviewed.    Significant Imaging: I have reviewed all pertinent imaging results/findings within the past 24 hours.

## 2019-08-23 LAB
ALBUMIN SERPL BCP-MCNC: 3.6 G/DL (ref 3.5–5.2)
ALP SERPL-CCNC: 44 U/L (ref 55–135)
ALT SERPL W/O P-5'-P-CCNC: 44 U/L (ref 10–44)
ANION GAP SERPL CALC-SCNC: 11 MMOL/L (ref 8–16)
AST SERPL-CCNC: 45 U/L (ref 10–40)
BASOPHILS # BLD AUTO: 0.04 K/UL (ref 0–0.2)
BASOPHILS NFR BLD: 1 % (ref 0–1.9)
BILIRUB SERPL-MCNC: 1.1 MG/DL (ref 0.1–1)
BUN SERPL-MCNC: 10 MG/DL (ref 6–20)
CALCIUM SERPL-MCNC: 9.8 MG/DL (ref 8.7–10.5)
CHLORIDE SERPL-SCNC: 105 MMOL/L (ref 95–110)
CO2 SERPL-SCNC: 25 MMOL/L (ref 23–29)
CREAT SERPL-MCNC: 0.7 MG/DL (ref 0.5–1.4)
DIFFERENTIAL METHOD: ABNORMAL
EOSINOPHIL # BLD AUTO: 0.1 K/UL (ref 0–0.5)
EOSINOPHIL NFR BLD: 3.2 % (ref 0–8)
ERYTHROCYTE [DISTWIDTH] IN BLOOD BY AUTOMATED COUNT: 13.1 % (ref 11.5–14.5)
EST. GFR  (AFRICAN AMERICAN): >60 ML/MIN/1.73 M^2
EST. GFR  (NON AFRICAN AMERICAN): >60 ML/MIN/1.73 M^2
GLUCOSE SERPL-MCNC: 96 MG/DL (ref 70–110)
HCT VFR BLD AUTO: 42.2 % (ref 40–54)
HGB BLD-MCNC: 14 G/DL (ref 14–18)
IMM GRANULOCYTES # BLD AUTO: 0.01 K/UL (ref 0–0.04)
IMM GRANULOCYTES NFR BLD AUTO: 0.2 % (ref 0–0.5)
INR PPP: 1 (ref 0.8–1.2)
LYMPHOCYTES # BLD AUTO: 1 K/UL (ref 1–4.8)
LYMPHOCYTES NFR BLD: 24.2 % (ref 18–48)
MAGNESIUM SERPL-MCNC: 1.8 MG/DL (ref 1.6–2.6)
MCH RBC QN AUTO: 32.6 PG (ref 27–31)
MCHC RBC AUTO-ENTMCNC: 33.2 G/DL (ref 32–36)
MCV RBC AUTO: 98 FL (ref 82–98)
MONOCYTES # BLD AUTO: 0.8 K/UL (ref 0.3–1)
MONOCYTES NFR BLD: 18.5 % (ref 4–15)
NEUTROPHILS # BLD AUTO: 2.1 K/UL (ref 1.8–7.7)
NEUTROPHILS NFR BLD: 52.9 % (ref 38–73)
NRBC BLD-RTO: 0 /100 WBC
PHOSPHATE SERPL-MCNC: 5.9 MG/DL (ref 2.7–4.5)
PLATELET # BLD AUTO: 91 K/UL (ref 150–350)
PMV BLD AUTO: 11 FL (ref 9.2–12.9)
POTASSIUM SERPL-SCNC: 3.5 MMOL/L (ref 3.5–5.1)
PROT SERPL-MCNC: 6.6 G/DL (ref 6–8.4)
PROTHROMBIN TIME: 10.5 SEC (ref 9–12.5)
RBC # BLD AUTO: 4.3 M/UL (ref 4.6–6.2)
SODIUM SERPL-SCNC: 141 MMOL/L (ref 136–145)
WBC # BLD AUTO: 4.05 K/UL (ref 3.9–12.7)

## 2019-08-23 PROCEDURE — 99232 PR SUBSEQUENT HOSPITAL CARE,LEVL II: ICD-10-PCS | Mod: ,,, | Performed by: HOSPITALIST

## 2019-08-23 PROCEDURE — 80053 COMPREHEN METABOLIC PANEL: CPT

## 2019-08-23 PROCEDURE — 83735 ASSAY OF MAGNESIUM: CPT

## 2019-08-23 PROCEDURE — 11000001 HC ACUTE MED/SURG PRIVATE ROOM

## 2019-08-23 PROCEDURE — 25000003 PHARM REV CODE 250: Performed by: HOSPITALIST

## 2019-08-23 PROCEDURE — 36415 COLL VENOUS BLD VENIPUNCTURE: CPT

## 2019-08-23 PROCEDURE — 85025 COMPLETE CBC W/AUTO DIFF WBC: CPT

## 2019-08-23 PROCEDURE — 84100 ASSAY OF PHOSPHORUS: CPT

## 2019-08-23 PROCEDURE — 85610 PROTHROMBIN TIME: CPT

## 2019-08-23 PROCEDURE — 99232 SBSQ HOSP IP/OBS MODERATE 35: CPT | Mod: ,,, | Performed by: HOSPITALIST

## 2019-08-23 RX ADMIN — ACETAMINOPHEN 650 MG: 325 TABLET ORAL at 08:08

## 2019-08-23 RX ADMIN — SERTRALINE HYDROCHLORIDE 25 MG: 25 TABLET ORAL at 09:08

## 2019-08-23 RX ADMIN — SENNOSIDES,DOCUSATE SODIUM 1 TABLET: 8.6; 5 TABLET, FILM COATED ORAL at 09:08

## 2019-08-23 RX ADMIN — SENNOSIDES,DOCUSATE SODIUM 1 TABLET: 8.6; 5 TABLET, FILM COATED ORAL at 08:08

## 2019-08-23 RX ADMIN — FOLIC ACID 1 MG: 1 TABLET ORAL at 09:08

## 2019-08-23 RX ADMIN — DIAZEPAM 5 MG: 5 TABLET ORAL at 09:08

## 2019-08-23 RX ADMIN — Medication 100 MG: at 09:08

## 2019-08-23 RX ADMIN — Medication 10 ML: at 09:08

## 2019-08-23 NOTE — SUBJECTIVE & OBJECTIVE
Interval History: No acute issues overnight. Awake and alert. Tremors have slightly returned. Tolerating diet without N/V. Completing Valium taper.     Review of Systems   Constitutional: Negative for chills and fever.   HENT: Negative for congestion.    Respiratory: Negative for cough and shortness of breath.    Cardiovascular: Negative for chest pain and palpitations.   Gastrointestinal: Negative for abdominal distention and abdominal pain.   Genitourinary: Negative.    Musculoskeletal: Negative.    Psychiatric/Behavioral: Negative.      Objective:     Vital Signs (Most Recent):  Temp: 97.9 °F (36.6 °C) (08/23/19 0908)  Pulse: 101 (08/23/19 0908)  Resp: 14 (08/23/19 0908)  BP: (!) 137/96 (08/23/19 0908)  SpO2: 99 % (08/23/19 0908) Vital Signs (24h Range):  Temp:  [97.6 °F (36.4 °C)-98.1 °F (36.7 °C)] 97.9 °F (36.6 °C)  Pulse:  [] 101  Resp:  [14-21] 14  SpO2:  [96 %-99 %] 99 %  BP: (137-171)/(74-96) 137/96        There is no height or weight on file to calculate BMI.    Intake/Output Summary (Last 24 hours) at 8/23/2019 0947  Last data filed at 8/22/2019 1800  Gross per 24 hour   Intake 400 ml   Output --   Net 400 ml      Physical Exam   Constitutional: He is oriented to person, place, and time. He appears well-developed and well-nourished. No distress.   HENT:   Head: Normocephalic and atraumatic.   Eyes: Pupils are equal, round, and reactive to light. No scleral icterus.   Neck: Normal range of motion.   Cardiovascular: Normal rate, regular rhythm and normal heart sounds.   No murmur heard.  Pulmonary/Chest: Effort normal and breath sounds normal. No respiratory distress. He has no wheezes.   Abdominal: Soft. Bowel sounds are normal. He exhibits no distension. There is no tenderness.   Musculoskeletal: He exhibits no edema or deformity.   Neurological: He is alert and oriented to person, place, and time.   Skin: Skin is warm and dry. He is not diaphoretic. No erythema.   Psychiatric: He has a normal mood  and affect. His behavior is normal. Judgment and thought content normal.       Significant Labs: All pertinent labs within the past 24 hours have been reviewed.    Significant Imaging: I have reviewed all pertinent imaging results/findings within the past 24 hours.

## 2019-08-23 NOTE — ASSESSMENT & PLAN NOTE
ALCOHOL/SEDATIVE WITHDRAWAL PRECAUTIONS   Cont multivitamin, thiamine 100 mg, and folate 1 mg daily   Monitor vital signs and CIWA q4 hours   Ativan 2 mg q4 hours prn SBP> 160 and HR> 110. Both parameters must be met  Valium 5 mg PO daily for now   Electrolyte repletion PRN  Patient motivated to quit; will provide with resources

## 2019-08-23 NOTE — NURSING
Pt rem ain stable resp even no distress noted , encouraged pt to shower supplies given at bedside. Ambulating in pretty no tremors noted

## 2019-08-23 NOTE — PLAN OF CARE
Problem: Adult Inpatient Plan of Care  Goal: Plan of Care Review  Outcome: Ongoing (interventions implemented as appropriate)  Patient AAOx4. Vitals WNL. CIWA and neuro checks done q4hrs per orders. No complaints of pain this shift. Diazepam PO given x1 per orders. Patient able to make needs known to staff. Bed in lowest position. Call light within reach. Monitoring.

## 2019-08-23 NOTE — PROGRESS NOTES
[facility] Hospital Medicine  Progress Note    Patient Name: Darryn Frias  MRN: [id number]  Patient Class: IP- Inpatient   Admission Date: 8/20/2019  Length of Stay: 3 days  Attending Physician: Ute Corea MD  Primary Care Provider: No primary care provider on file.    Hospital Medicine Team: [facility] HOSP MED G Ute Dorantes MD    Subjective:     Principal Problem:Alcohol withdrawal        HPI:  History of Present Illness:  Patient is a 29 y.o. male with PMH of alcohol abuse over the past year who presents with nausea, vomiting, SOB, and shaking. He stated that he would like to stop drinking alcohol and that's why he came in. The patient and his father both started drinking heavily after his mother's death about a year ago. He drinks about 750 mL of vodka per day. His last drink was about 20 hrs ago. He is very worried that he is in liver failure as well and worried about his abdomen being very distended. He denies any fevers, chills, LOC, CP, diarrhea, or other symptoms. He was prescribed zoloft but has been off of it for about 3 weeks now.     Overview/Hospital Course:  No notes on file    Interval History: No acute issues overnight. Awake and alert. Tremors have slightly returned. Tolerating diet without N/V. Completing Valium taper.     Review of Systems   Constitutional: Negative for chills and fever.   HENT: Negative for congestion.    Respiratory: Negative for cough and shortness of breath.    Cardiovascular: Negative for chest pain and palpitations.   Gastrointestinal: Negative for abdominal distention and abdominal pain.   Genitourinary: Negative.    Musculoskeletal: Negative.    Psychiatric/Behavioral: Negative.      Objective:     Vital Signs (Most Recent):  Temp: 97.9 °F (36.6 °C) (08/23/19 0908)  Pulse: 101 (08/23/19 0908)  Resp: 14 (08/23/19 0908)  BP: (!) 137/96 (08/23/19 0908)  SpO2: 99 % (08/23/19 0908) Vital Signs (24h Range):  Temp:  [97.6 °F (36.4 °C)-98.1 °F (36.7  °C)] 97.9 °F (36.6 °C)  Pulse:  [] 101  Resp:  [14-21] 14  SpO2:  [96 %-99 %] 99 %  BP: (137-171)/(74-96) 137/96        There is no height or weight on file to calculate BMI.    Intake/Output Summary (Last 24 hours) at 8/23/2019 0947  Last data filed at 8/22/2019 1800  Gross per 24 hour   Intake 400 ml   Output --   Net 400 ml      Physical Exam   Constitutional: He is oriented to person, place, and time. He appears well-developed and well-nourished. No distress.   HENT:   Head: Normocephalic and atraumatic.   Eyes: Pupils are equal, round, and reactive to light. No scleral icterus.   Neck: Normal range of motion.   Cardiovascular: Normal rate, regular rhythm and normal heart sounds.   No murmur heard.  Pulmonary/Chest: Effort normal and breath sounds normal. No respiratory distress. He has no wheezes.   Abdominal: Soft. Bowel sounds are normal. He exhibits no distension. There is no tenderness.   Musculoskeletal: He exhibits no edema or deformity.   Neurological: He is alert and oriented to person, place, and time.   Skin: Skin is warm and dry. He is not diaphoretic. No erythema.   Psychiatric: He has a normal mood and affect. His behavior is normal. Judgment and thought content normal.       Significant Labs: All pertinent labs within the past 24 hours have been reviewed.    Significant Imaging: I have reviewed all pertinent imaging results/findings within the past 24 hours.      Assessment/Plan:      * Alcohol withdrawal  ALCOHOL/SEDATIVE WITHDRAWAL PRECAUTIONS   Cont multivitamin, thiamine 100 mg, and folate 1 mg daily   Monitor vital signs and CIWA q4 hours   Ativan 2 mg q4 hours prn SBP> 160 and HR> 110. Both parameters must be met  Valium 5 mg PO daily for now   Electrolyte repletion PRN  Patient motivated to quit; will provide with resources     Anxiety and depression  Patient stopped taking zoloft about 2-3 weeks ago  Will resume and monitor closely       Alcoholic hepatitis without ascites  Lab  Results   Component Value Date    ALT 49 (H) 08/22/2019    AST 55 (H) 08/22/2019    ALKPHOS 42 (L) 08/22/2019    BILITOT 1.4 (H) 08/22/2019     Encouraged to stop alcohol  Cont to monitor  Improving.    Abd US: Hepatomegaly and hepatic steatosis.      VTE Risk Mitigation (From admission, onward)        Ordered     IP VTE HIGH RISK PATIENT  Once      08/20/19 0742     Place OMAR hose  Until discontinued      08/20/19 0648                Ute Dorantes MD  Department of Hospital Medicine   [facility]

## 2019-08-23 NOTE — PLAN OF CARE
08/23/19 1456   Discharge Reassessment   Assessment Type Discharge Planning Reassessment   Provided patient/caregiver education on the expected discharge date and the discharge plan Yes   Do you have any problems affording any of your prescribed medications? No   Discharge Plan A Home with family   DME Needed Upon Discharge  none   Anticipated Discharge Disposition Home   Post-Acute Status   Post-Acute Authorization Other   Other Status No Post-Acute Service Needs   Discharge Delays None known at this time

## 2019-08-24 VITALS
HEART RATE: 114 BPM | SYSTOLIC BLOOD PRESSURE: 132 MMHG | RESPIRATION RATE: 22 BRPM | TEMPERATURE: 98 F | DIASTOLIC BLOOD PRESSURE: 94 MMHG | OXYGEN SATURATION: 99 %

## 2019-08-24 LAB
ALBUMIN SERPL BCP-MCNC: 3.7 G/DL (ref 3.5–5.2)
ALP SERPL-CCNC: 41 U/L (ref 55–135)
ALT SERPL W/O P-5'-P-CCNC: 54 U/L (ref 10–44)
ANION GAP SERPL CALC-SCNC: 12 MMOL/L (ref 8–16)
AST SERPL-CCNC: 51 U/L (ref 10–40)
BASOPHILS # BLD AUTO: 0.02 K/UL (ref 0–0.2)
BASOPHILS NFR BLD: 0.5 % (ref 0–1.9)
BILIRUB SERPL-MCNC: 0.9 MG/DL (ref 0.1–1)
BUN SERPL-MCNC: 11 MG/DL (ref 6–20)
CALCIUM SERPL-MCNC: 9.9 MG/DL (ref 8.7–10.5)
CHLORIDE SERPL-SCNC: 107 MMOL/L (ref 95–110)
CO2 SERPL-SCNC: 23 MMOL/L (ref 23–29)
CREAT SERPL-MCNC: 0.7 MG/DL (ref 0.5–1.4)
DIFFERENTIAL METHOD: ABNORMAL
EOSINOPHIL # BLD AUTO: 0.1 K/UL (ref 0–0.5)
EOSINOPHIL NFR BLD: 3.3 % (ref 0–8)
ERYTHROCYTE [DISTWIDTH] IN BLOOD BY AUTOMATED COUNT: 13.1 % (ref 11.5–14.5)
EST. GFR  (AFRICAN AMERICAN): >60 ML/MIN/1.73 M^2
EST. GFR  (NON AFRICAN AMERICAN): >60 ML/MIN/1.73 M^2
GLUCOSE SERPL-MCNC: 106 MG/DL (ref 70–110)
HCT VFR BLD AUTO: 43.4 % (ref 40–54)
HGB BLD-MCNC: 14.3 G/DL (ref 14–18)
IMM GRANULOCYTES # BLD AUTO: 0.01 K/UL (ref 0–0.04)
IMM GRANULOCYTES NFR BLD AUTO: 0.3 % (ref 0–0.5)
INR PPP: 1 (ref 0.8–1.2)
LYMPHOCYTES # BLD AUTO: 0.9 K/UL (ref 1–4.8)
LYMPHOCYTES NFR BLD: 23.2 % (ref 18–48)
MAGNESIUM SERPL-MCNC: 1.9 MG/DL (ref 1.6–2.6)
MCH RBC QN AUTO: 33.3 PG (ref 27–31)
MCHC RBC AUTO-ENTMCNC: 32.9 G/DL (ref 32–36)
MCV RBC AUTO: 101 FL (ref 82–98)
MONOCYTES # BLD AUTO: 0.7 K/UL (ref 0.3–1)
MONOCYTES NFR BLD: 18.1 % (ref 4–15)
NEUTROPHILS # BLD AUTO: 2.2 K/UL (ref 1.8–7.7)
NEUTROPHILS NFR BLD: 54.6 % (ref 38–73)
NRBC BLD-RTO: 0 /100 WBC
PHOSPHATE SERPL-MCNC: 4.5 MG/DL (ref 2.7–4.5)
PLATELET # BLD AUTO: 113 K/UL (ref 150–350)
PMV BLD AUTO: 10.6 FL (ref 9.2–12.9)
POTASSIUM SERPL-SCNC: 4.3 MMOL/L (ref 3.5–5.1)
PROT SERPL-MCNC: 6.8 G/DL (ref 6–8.4)
PROTHROMBIN TIME: 10.3 SEC (ref 9–12.5)
RBC # BLD AUTO: 4.3 M/UL (ref 4.6–6.2)
SODIUM SERPL-SCNC: 142 MMOL/L (ref 136–145)
WBC # BLD AUTO: 3.97 K/UL (ref 3.9–12.7)

## 2019-08-24 PROCEDURE — 36415 COLL VENOUS BLD VENIPUNCTURE: CPT

## 2019-08-24 PROCEDURE — 85025 COMPLETE CBC W/AUTO DIFF WBC: CPT

## 2019-08-24 PROCEDURE — 25000003 PHARM REV CODE 250: Performed by: HOSPITALIST

## 2019-08-24 PROCEDURE — 80053 COMPREHEN METABOLIC PANEL: CPT

## 2019-08-24 PROCEDURE — 99239 PR HOSPITAL DISCHARGE DAY,>30 MIN: ICD-10-PCS | Mod: ,,, | Performed by: HOSPITALIST

## 2019-08-24 PROCEDURE — 83735 ASSAY OF MAGNESIUM: CPT

## 2019-08-24 PROCEDURE — 99239 HOSP IP/OBS DSCHRG MGMT >30: CPT | Mod: ,,, | Performed by: HOSPITALIST

## 2019-08-24 PROCEDURE — 84100 ASSAY OF PHOSPHORUS: CPT

## 2019-08-24 PROCEDURE — 85610 PROTHROMBIN TIME: CPT

## 2019-08-24 RX ORDER — LANOLIN ALCOHOL/MO/W.PET/CERES
100 CREAM (GRAM) TOPICAL DAILY
COMMUNITY
Start: 2019-08-24

## 2019-08-24 RX ORDER — FOLIC ACID 1 MG/1
1 TABLET ORAL DAILY
Qty: 30 TABLET | Refills: 11 | Status: SHIPPED | OUTPATIENT
Start: 2019-08-24 | End: 2020-08-23

## 2019-08-24 RX ADMIN — FOLIC ACID 1 MG: 1 TABLET ORAL at 09:08

## 2019-08-24 RX ADMIN — SERTRALINE HYDROCHLORIDE 25 MG: 25 TABLET ORAL at 09:08

## 2019-08-24 RX ADMIN — SENNOSIDES,DOCUSATE SODIUM 1 TABLET: 8.6; 5 TABLET, FILM COATED ORAL at 09:08

## 2019-08-24 RX ADMIN — Medication 10 ML: at 09:08

## 2019-08-24 RX ADMIN — Medication 100 MG: at 09:08

## 2019-08-24 NOTE — ASSESSMENT & PLAN NOTE
ALCOHOL/SEDATIVE WITHDRAWAL PRECAUTIONS   Cont multivitamin, thiamine 100 mg, and folate 1 mg daily   Monitor vital signs and CIWA q4 hours   Ativan 2 mg q4 hours prn SBP> 160 and HR> 110. Both parameters must be met  Completed Valium taper and has not required any PRN ativan  Electrolyte repletion PRN  Patient motivated to quit; provided with resources

## 2019-08-24 NOTE — ASSESSMENT & PLAN NOTE
Lab Results   Component Value Date    ALT 54 (H) 08/24/2019    AST 51 (H) 08/24/2019    ALKPHOS 41 (L) 08/24/2019    BILITOT 0.9 08/24/2019     Encouraged to stop alcohol  Cont to monitor  Improving.    Abd US: Hepatomegaly and hepatic steatosis.

## 2019-08-24 NOTE — PLAN OF CARE
Problem: Adult Inpatient Plan of Care  Goal: Plan of Care Review  Outcome: Outcome(s) achieved Date Met: 08/24/19 08/24/19 1048   Plan of Care Review   Plan of Care Reviewed With patient   Progress improving     Pt free of falls/trauma/injuries. Bed in low position, wheels locked, and call light within reach. Skin integrity remains unchanged. PIV removed and intact with gauze and co-band applied. Discharge orders/instructions from AVS given and reviewed with pt, verbalized understanding. No distress noted/reported at this time. Will continue to monitor while awaiting transport.

## 2019-08-24 NOTE — DISCHARGE SUMMARY
[facility] Hospital Medicine  Discharge Summary      Patient Name: Darryn Frias  MRN: [id number]  Admission Date: 8/20/2019  Hospital Length of Stay: 4 days  Discharge Date and Time:  08/24/2019 9:24 AM  Attending Physician: Ute Corea MD   Discharging Provider: Ute Dorantes MD  Primary Care Provider: Primary Doctor [facility] Medicine Team: [facility] MED  Ute Dorantes MD    HPI:   History of Present Illness:  Patient is a 29 y.o. male with PMH of alcohol abuse over the past year who presents with nausea, vomiting, SOB, and shaking. He stated that he would like to stop drinking alcohol and that's why he came in. The patient and his father both started drinking heavily after his mother's death about a year ago. He drinks about 750 mL of vodka per day. His last drink was about 20 hrs ago. He is very worried that he is in liver failure as well and worried about his abdomen being very distended. He denies any fevers, chills, LOC, CP, diarrhea, or other symptoms. He was prescribed zoloft but has been off of it for about 3 weeks now.     * No surgery found *      Hospital Course:   No notes on file     Consults:   Consults (From admission, onward)        Status Ordering Provider     Inpatient consult to PICC team (SATINDER)  Once     Provider:  (Not yet assigned)    SHIRAZ Carroll          * Alcohol withdrawal  ALCOHOL/SEDATIVE WITHDRAWAL PRECAUTIONS   Cont multivitamin, thiamine 100 mg, and folate 1 mg daily   Monitor vital signs and CIWA q4 hours   Ativan 2 mg q4 hours prn SBP> 160 and HR> 110. Both parameters must be met  Completed Valium taper and has not required any PRN ativan  Electrolyte repletion PRN  Patient motivated to quit; provided with resources     Anxiety and depression  Patient stopped taking zoloft about 2-3 weeks ago  Will resume and monitor closely       Alcoholic hepatitis without ascites  Lab Results   Component Value Date    ALT 54 (H) 08/24/2019     AST 51 (H) 08/24/2019    ALKPHOS 41 (L) 08/24/2019    BILITOT 0.9 08/24/2019     Encouraged to stop alcohol  Cont to monitor  Improving.    Abd US: Hepatomegaly and hepatic steatosis.      Final Active Diagnoses:    Diagnosis Date Noted POA    PRINCIPAL PROBLEM:  Alcohol withdrawal [F10.239] 08/20/2019 Yes    Alcoholic hepatitis without ascites [K70.10] 08/20/2019 Yes    Anxiety and depression [F41.9, F32.9] 08/20/2019 Yes      Problems Resolved During this Admission:       Discharged Condition: good    Disposition: Home or Self Care    Follow Up:  Follow-up Information     Primary Doctor No In 1 week.    Why:  For follow up and review of hosptial course                Patient Instructions:      Ambulatory consult to Psychiatry   Referral Priority: Routine Referral Type: Psychiatric   Referral Reason: Specialty Services Required   Requested Specialty: Psychiatry   Number of Visits Requested: 1     Diet Adult Regular     Notify your health care provider if you experience any of the following:  persistent nausea and vomiting or diarrhea     Notify your health care provider if you experience any of the following:  temperature >100.4     Activity as tolerated       Significant Diagnostic Studies: Labs:   BMP:   Recent Labs   Lab 08/23/19  0330 08/24/19  0548   GLU 96 106    142   K 3.5 4.3    107   CO2 25 23   BUN 10 11   CREATININE 0.7 0.7   CALCIUM 9.8 9.9   MG 1.8 1.9    and CBC   Recent Labs   Lab 08/23/19  0330 08/24/19  0548   WBC 4.05 3.97   HGB 14.0 14.3   HCT 42.2 43.4   PLT 91* 113*       Pending Diagnostic Studies:     None         Medications:  Reconciled Home Medications:      Medication List      START taking these medications    folic acid 1 MG tablet  Commonly known as:  FOLVITE  Take 1 tablet (1 mg total) by mouth once daily.     thiamine 100 MG tablet  Take 1 tablet (100 mg total) by mouth once daily.        CONTINUE taking these medications    ondansetron 4 MG Tbdl  Commonly known as:   ZOFRAN-ODT  Take 1 tablet (4 mg total) by mouth every 8 (eight) hours as needed.     sertraline 25 MG tablet  Commonly known as:  ZOLOFT  Take 25 mg by mouth once daily.        STOP taking these medications    LORazepam 1 MG tablet  Commonly known as:  ATIVAN            Indwelling Lines/Drains at time of discharge:   Lines/Drains/Airways          None          Time spent on the discharge of patient: 30 minutes  Patient was seen and examined on the date of discharge and determined to be suitable for discharge.         Ute Dorantes MD  Department of Hospital Medicine  [facility]

## 2019-08-26 NOTE — PLAN OF CARE
Patient was discharged Saturday 8/24.       08/26/19 1212   Final Note   Assessment Type Final Discharge Note   Anticipated Discharge Disposition Home   What phone number can be called within the next 1-3 days to see how you are doing after discharge? [phone]   Right Care Referral Info   Post Acute Recommendation No Care

## 2019-08-26 NOTE — PLAN OF CARE
08/26/19 1214   Post-Acute Status   Post-Acute Authorization Other   Other Status No Post-Acute Service Needs   Discharge Delays None known at this time